# Patient Record
Sex: MALE | Race: WHITE | NOT HISPANIC OR LATINO | URBAN - METROPOLITAN AREA
[De-identification: names, ages, dates, MRNs, and addresses within clinical notes are randomized per-mention and may not be internally consistent; named-entity substitution may affect disease eponyms.]

---

## 2017-09-18 ENCOUNTER — OUTPATIENT (OUTPATIENT)
Dept: OUTPATIENT SERVICES | Facility: HOSPITAL | Age: 77
LOS: 1 days | End: 2017-09-18
Payer: COMMERCIAL

## 2017-09-18 ENCOUNTER — APPOINTMENT (OUTPATIENT)
Age: 77
End: 2017-09-18
Payer: COMMERCIAL

## 2017-09-18 DIAGNOSIS — Z98.89 OTHER SPECIFIED POSTPROCEDURAL STATES: Chronic | ICD-10-CM

## 2017-09-18 DIAGNOSIS — G45.9 TRANSIENT CEREBRAL ISCHEMIC ATTACK, UNSPECIFIED: ICD-10-CM

## 2017-09-18 DIAGNOSIS — Z95.5 PRESENCE OF CORONARY ANGIOPLASTY IMPLANT AND GRAFT: Chronic | ICD-10-CM

## 2017-09-18 PROCEDURE — 70551 MRI BRAIN STEM W/O DYE: CPT

## 2017-09-18 PROCEDURE — 70551 MRI BRAIN STEM W/O DYE: CPT | Mod: 26

## 2018-07-13 ENCOUNTER — APPOINTMENT (OUTPATIENT)
Dept: SURGERY | Facility: CLINIC | Age: 78
End: 2018-07-13
Payer: MEDICARE

## 2018-07-13 VITALS
OXYGEN SATURATION: 97 % | BODY MASS INDEX: 25.87 KG/M2 | HEART RATE: 71 BPM | TEMPERATURE: 98.1 F | WEIGHT: 191 LBS | HEIGHT: 72 IN | SYSTOLIC BLOOD PRESSURE: 138 MMHG | DIASTOLIC BLOOD PRESSURE: 84 MMHG

## 2018-07-13 DIAGNOSIS — R19.04 LEFT LOWER QUADRANT ABDOMINAL SWELLING, MASS AND LUMP: ICD-10-CM

## 2018-07-13 DIAGNOSIS — R10.32 LEFT LOWER QUADRANT PAIN: ICD-10-CM

## 2018-07-13 DIAGNOSIS — K40.90 UNILATERAL INGUINAL HERNIA, W/OUT OBSTRUCTION OR GANGRENE, NOT SPECIFIED AS RECURRENT: ICD-10-CM

## 2018-07-13 PROCEDURE — 99204 OFFICE O/P NEW MOD 45 MIN: CPT

## 2018-07-18 ENCOUNTER — OUTPATIENT (OUTPATIENT)
Dept: OUTPATIENT SERVICES | Facility: HOSPITAL | Age: 78
LOS: 1 days | End: 2018-07-18
Payer: COMMERCIAL

## 2018-07-18 DIAGNOSIS — Z98.89 OTHER SPECIFIED POSTPROCEDURAL STATES: Chronic | ICD-10-CM

## 2018-07-18 DIAGNOSIS — Z95.5 PRESENCE OF CORONARY ANGIOPLASTY IMPLANT AND GRAFT: Chronic | ICD-10-CM

## 2018-07-18 DIAGNOSIS — R06.00 DYSPNEA, UNSPECIFIED: ICD-10-CM

## 2018-07-18 DIAGNOSIS — I25.10 ATHEROSCLEROTIC HEART DISEASE OF NATIVE CORONARY ARTERY WITHOUT ANGINA PECTORIS: ICD-10-CM

## 2018-07-18 PROCEDURE — 78452 HT MUSCLE IMAGE SPECT MULT: CPT | Mod: 26

## 2018-07-18 PROCEDURE — 78452 HT MUSCLE IMAGE SPECT MULT: CPT

## 2018-07-18 PROCEDURE — 93018 CV STRESS TEST I&R ONLY: CPT

## 2018-07-18 PROCEDURE — A9500: CPT

## 2018-07-18 PROCEDURE — 93017 CV STRESS TEST TRACING ONLY: CPT

## 2018-07-18 PROCEDURE — 93016 CV STRESS TEST SUPVJ ONLY: CPT

## 2018-10-17 ENCOUNTER — APPOINTMENT (OUTPATIENT)
Dept: SURGERY | Facility: CLINIC | Age: 78
End: 2018-10-17
Payer: MEDICARE

## 2018-10-17 VITALS
HEART RATE: 63 BPM | BODY MASS INDEX: 26.14 KG/M2 | SYSTOLIC BLOOD PRESSURE: 144 MMHG | OXYGEN SATURATION: 99 % | DIASTOLIC BLOOD PRESSURE: 74 MMHG | WEIGHT: 193 LBS | TEMPERATURE: 97.5 F | HEIGHT: 72 IN

## 2018-10-17 DIAGNOSIS — K40.91 UNILATERAL INGUINAL HERNIA, W/OUT OBSTRUCTION OR GANGRENE, RECURRENT: ICD-10-CM

## 2018-10-17 PROCEDURE — 99214 OFFICE O/P EST MOD 30 MIN: CPT

## 2019-11-21 ENCOUNTER — APPOINTMENT (OUTPATIENT)
Dept: UROLOGY | Facility: CLINIC | Age: 79
End: 2019-11-21
Payer: MEDICARE

## 2019-11-21 VITALS
SYSTOLIC BLOOD PRESSURE: 139 MMHG | HEART RATE: 52 BPM | DIASTOLIC BLOOD PRESSURE: 87 MMHG | RESPIRATION RATE: 14 BRPM | WEIGHT: 195 LBS | BODY MASS INDEX: 26.41 KG/M2 | HEIGHT: 72 IN

## 2019-11-21 DIAGNOSIS — C61 MALIGNANT NEOPLASM OF PROSTATE: ICD-10-CM

## 2019-11-21 LAB
BILIRUB UR QL STRIP: NORMAL
CLARITY UR: CLEAR
COLLECTION METHOD: NORMAL
GLUCOSE UR-MCNC: 100
HCG UR QL: 1 EU/DL
HGB UR QL STRIP.AUTO: NORMAL
KETONES UR-MCNC: NORMAL
LEUKOCYTE ESTERASE UR QL STRIP: NORMAL
NITRITE UR QL STRIP: NORMAL
PH UR STRIP: 6
PROT UR STRIP-MCNC: 30
SP GR UR STRIP: 1.02

## 2019-11-21 PROCEDURE — 99204 OFFICE O/P NEW MOD 45 MIN: CPT | Mod: 25

## 2019-11-21 PROCEDURE — 81003 URINALYSIS AUTO W/O SCOPE: CPT | Mod: QW

## 2019-11-21 PROCEDURE — 51798 US URINE CAPACITY MEASURE: CPT

## 2019-11-22 LAB
APPEARANCE: CLEAR
BACTERIA: ABNORMAL
BILIRUBIN URINE: NEGATIVE
BLOOD URINE: NEGATIVE
CALCIUM OXALATE CRYSTALS: ABNORMAL
COLOR: YELLOW
GLUCOSE QUALITATIVE U: ABNORMAL
HYALINE CASTS: 1 /LPF
KETONES URINE: NEGATIVE
LEUKOCYTE ESTERASE URINE: NEGATIVE
MICROSCOPIC-UA: NORMAL
NITRITE URINE: NEGATIVE
PH URINE: 6
PROTEIN URINE: NORMAL
RED BLOOD CELLS URINE: 4 /HPF
SPECIFIC GRAVITY URINE: 1.03
SQUAMOUS EPITHELIAL CELLS: 2 /HPF
URIC ACID CRYSTALS: ABNORMAL
URINE CYTOLOGY: NORMAL
UROBILINOGEN URINE: NORMAL
WHITE BLOOD CELLS URINE: 2 /HPF

## 2019-11-25 LAB — BACTERIA UR CULT: NORMAL

## 2019-12-02 NOTE — HISTORY OF PRESENT ILLNESS
[FreeTextEntry1] : 80 yo male presents for Gross hematuria. \par Recently had urine test and was told has microscopic hematuria. Today saw pink urine. \par Has history of kidney stone, had surgery with Dr Loyola 5 years ago. \par No history of recurrent urinary tract infections. \par Smoker- past, 1PPD for 40 years. Quit- 2001. \par On Flomax. Reports variable stream, urinates every few hours or so during the day. Nocturia of 3-4 x. \par Endorses urinary incontinence. \par Denies dysuria, lower abdominal or flank pain, fever, chills or rigors.\par \par Has history of Prostate cancer status post Radiation therapy. \par \par

## 2019-12-02 NOTE — PHYSICAL EXAM
[Normal Appearance] : normal appearance [General Appearance - Well Developed] : well developed [General Appearance - In No Acute Distress] : no acute distress [FreeTextEntry1] : normal peripheral circulation  [] : no respiratory distress [Abdomen Soft] : soft [Abdomen Tenderness] : non-tender [Abdomen Mass (___ Cm)] : no abdominal mass palpated [Costovertebral Angle Tenderness] : no ~M costovertebral angle tenderness [Penis Abnormality] : normal circumcised penis [Urethral Meatus] : meatus normal [Scrotum] : the scrotum was normal [Epididymis] : the epididymides were normal [Testes Tenderness] : no tenderness of the testes [Testes Mass (___cm)] : there were no testicular masses [Prostate Tenderness] : the prostate was not tender [No Prostate Nodules] : no prostate nodules [Prostate Size ___ gm] : prostate size [unfilled] gm [Normal Station and Gait] : the gait and station were normal for the patient's age [Skin Color & Pigmentation] : normal skin color and pigmentation [No Focal Deficits] : no focal deficits [Oriented To Time, Place, And Person] : oriented to person, place, and time [No Palpable Adenopathy] : no palpable adenopathy

## 2019-12-02 NOTE — ASSESSMENT
[FreeTextEntry1] : Benign Prostatic Hyperplasia:\par Continue Flomax. \par \par Prostate cancer:\par Status post Radiation therapy. \par PSA low and stable. \par \par Gross hematuria:\par Discussed the differential diagnosis of hematuria including benign and malignant pathology- including but not limited to nephrolithiasis, bladder stone, urinary tract infection, glomerular disease, renal cancer, bladder cancer, prostate cancer. We also discussed the chance that workup will not reveal a source for the bleeding. The patient understands that the hematuria could be from an upper tract (kidney or ureter) or lower tract (bladder, urethra, prostate) and that workup includes imaging and direct visualization of all of these.\par \par Will proceed with work up with Urinalysis with microscopy, Urine culture, Urine cytology, CT Urogram and Cystoscopy. \par \par Return to office after CT scan.

## 2019-12-02 NOTE — LETTER BODY
[Dear  ___] : Dear  [unfilled], [Consult Letter:] : I had the pleasure of evaluating your patient, [unfilled]. [( Thank you for referring [unfilled] for consultation for _____ )] : Thank you for referring [unfilled] for consultation for [unfilled] [Please see my note below.] : Please see my note below. [Consult Closing:] : Thank you very much for allowing me to participate in the care of this patient.  If you have any questions, please do not hesitate to contact me. [Sincerely,] : Sincerely, [FreeTextEntry3] : Sharan Avendano MD\par  of Urology\par [facility]\par \par Offices:\par [facility] @\par [address], [place] [address]\par and\par [address], [place] [address], [address]\par and\par [address] [address]\par \par TEL: [phone]\par FAX: [phone]

## 2019-12-09 ENCOUNTER — APPOINTMENT (OUTPATIENT)
Dept: UROLOGY | Facility: CLINIC | Age: 79
End: 2019-12-09
Payer: MEDICARE

## 2019-12-13 LAB
APPEARANCE: CLEAR
BACTERIA: NEGATIVE
BILIRUBIN URINE: NEGATIVE
BLOOD URINE: NEGATIVE
CALCIUM OXALATE CRYSTALS: ABNORMAL
COLOR: YELLOW
GLUCOSE QUALITATIVE U: ABNORMAL
HYALINE CASTS: 0 /LPF
KETONES URINE: NEGATIVE
LEUKOCYTE ESTERASE URINE: NEGATIVE
MICROSCOPIC-UA: NORMAL
NITRITE URINE: NEGATIVE
PH URINE: 6.5
PROTEIN URINE: NORMAL
RED BLOOD CELLS URINE: 1 /HPF
SPECIFIC GRAVITY URINE: 1.02
SQUAMOUS EPITHELIAL CELLS: 1 /HPF
UROBILINOGEN URINE: ABNORMAL
WHITE BLOOD CELLS URINE: 5 /HPF

## 2019-12-16 DIAGNOSIS — N39.0 URINARY TRACT INFECTION, SITE NOT SPECIFIED: ICD-10-CM

## 2019-12-16 LAB — BACTERIA UR CULT: NORMAL

## 2019-12-23 ENCOUNTER — APPOINTMENT (OUTPATIENT)
Dept: UROLOGY | Facility: CLINIC | Age: 79
End: 2019-12-23
Payer: MEDICARE

## 2019-12-23 VITALS
HEART RATE: 72 BPM | WEIGHT: 195 LBS | DIASTOLIC BLOOD PRESSURE: 75 MMHG | HEIGHT: 72 IN | SYSTOLIC BLOOD PRESSURE: 153 MMHG | BODY MASS INDEX: 26.41 KG/M2

## 2019-12-23 DIAGNOSIS — N20.0 CALCULUS OF KIDNEY: ICD-10-CM

## 2019-12-23 PROCEDURE — 52000 CYSTOURETHROSCOPY: CPT

## 2019-12-23 PROCEDURE — 99214 OFFICE O/P EST MOD 30 MIN: CPT | Mod: 25

## 2019-12-24 LAB
APPEARANCE: CLEAR
BACTERIA: NEGATIVE
BILIRUBIN URINE: NEGATIVE
BLOOD URINE: ABNORMAL
COLOR: COLORLESS
GLUCOSE QUALITATIVE U: NEGATIVE
HYALINE CASTS: 1 /LPF
KETONES URINE: NEGATIVE
LEUKOCYTE ESTERASE URINE: NEGATIVE
MICROSCOPIC-UA: NORMAL
NITRITE URINE: NEGATIVE
PH URINE: 5.5
PROTEIN URINE: NEGATIVE
RED BLOOD CELLS URINE: 2 /HPF
SPECIFIC GRAVITY URINE: 1
SQUAMOUS EPITHELIAL CELLS: 1 /HPF
UROBILINOGEN URINE: NORMAL
WHITE BLOOD CELLS URINE: 0 /HPF

## 2019-12-26 PROBLEM — N20.0 RIGHT KIDNEY STONE: Status: ACTIVE | Noted: 2019-12-26

## 2019-12-26 LAB — BACTERIA UR CULT: NORMAL

## 2019-12-26 NOTE — ASSESSMENT
[FreeTextEntry1] : Benign Prostatic Hyperplasia:\par Continue Flomax. \par \par Kidney stone:\par Discussed the management options for non obstructing kidney stones- watch and wait Vs Ureteroscopy Vs Shock wave lithotripsy(if radio-opaque) Vs Percutaneous nephrolithotomy. Risks and benefits of each modality were discussed. Pt not interested in active intervention at this point. \par Recommended Kidney stone prevention diet:\par Good oral hydration so that urine is clear to light yellow, usually 1.5 to 2 Liters of fluids, mainly water\par Increasing Citrate in diet by consuming citrus fruits and juices- ru, limes, oranges, grapefruits and berries \par Less red meat\par Less salt\par Limit foods with oxalate like- dark green vegetables, rhubarb, chocolate, wheat bran, nuts, cranberries, and beans\par \par Gross hematuria:\par Discussed work up of hematuria so far. Recommended Cystoscopy. Discussed risks and benefits. \par Patient agreeable. Informed consent obtained. \par On Cystoscopy today- Bilobar prostatic enlargement with moderately enlarged lateral lobes with coaptation. \par Obtained catheterized specimen- Will get Urinalysis and Urine culture. \par Will inform results. \par \par Return to office in 3 months or sooner if any issues.

## 2019-12-26 NOTE — HISTORY OF PRESENT ILLNESS
[FreeTextEntry1] : 78 yo male presents for follow up. \par Taking Cefuroxime, has had positive Urine culture since last visit. \par No new episode of hematuria. \par Taking Flomax- urinating well. \par Denies dysuria, lower abdominal or flank pain, fever, chills or rigors.\par \par Initially seen for Gross hematuria. \par Recently had urine test and was told has microscopic hematuria. The day last visit saw pink urine. \par Has history of kidney stone, had surgery with Dr Loyola 5 years ago. \par No history of recurrent urinary tract infections. \par Smoker- past, 1PPD for 40 years. Quit- 2001. \par On Flomax. Reported variable stream, urinates every few hours or so during the day. Nocturia of 3-4 x. \par Endorsed urinary incontinence. \par \par Has history of Prostate cancer status post Radiation therapy. \par \par

## 2019-12-26 NOTE — LETTER BODY
[Dear  ___] : Dear  [unfilled], [Sincerely,] : Sincerely, [Please see my note below.] : Please see my note below. [Courtesy Letter:] : I had the pleasure of seeing your patient, [unfilled], in my office today. [FreeTextEntry3] : Sharan Avendano MD\par  of Urology\par [facility]\par \par Offices:\par [facility] @\par [address], [place] [address]\par and\par [address], [place] [address], [address]\par and\par [address] [address]\par \par TEL: [phone]\par FAX: [phone]

## 2020-04-02 ENCOUNTER — APPOINTMENT (OUTPATIENT)
Dept: UROLOGY | Facility: CLINIC | Age: 80
End: 2020-04-02

## 2020-05-28 ENCOUNTER — APPOINTMENT (OUTPATIENT)
Dept: UROLOGY | Facility: CLINIC | Age: 80
End: 2020-05-28

## 2020-06-04 ENCOUNTER — APPOINTMENT (OUTPATIENT)
Dept: UROLOGY | Facility: CLINIC | Age: 80
End: 2020-06-04

## 2020-06-29 ENCOUNTER — APPOINTMENT (OUTPATIENT)
Dept: UROLOGY | Facility: CLINIC | Age: 80
End: 2020-06-29

## 2020-08-13 ENCOUNTER — APPOINTMENT (OUTPATIENT)
Dept: UROLOGY | Facility: CLINIC | Age: 80
End: 2020-08-13
Payer: MEDICARE

## 2020-08-13 VITALS
WEIGHT: 195 LBS | DIASTOLIC BLOOD PRESSURE: 62 MMHG | TEMPERATURE: 97.6 F | HEART RATE: 56 BPM | BODY MASS INDEX: 26.41 KG/M2 | HEIGHT: 72 IN | SYSTOLIC BLOOD PRESSURE: 137 MMHG

## 2020-08-13 DIAGNOSIS — R31.0 GROSS HEMATURIA: ICD-10-CM

## 2020-08-13 PROCEDURE — 99214 OFFICE O/P EST MOD 30 MIN: CPT

## 2020-08-13 NOTE — ASSESSMENT
[FreeTextEntry1] : Benign Prostatic Hyperplasia:\par Will get Urinalysis with reflex Urine culture. Will inform results. \par Continue Flomax. \par \par Gross hematuria:\par Underwent work up for gross hematuria in December 2019: non obstructing right kidney stone and enlarged prostate. \par \par History of Prostate cancer:\par Status post Radiation therapy. \par PSA was low and stable. \par Continue PSA monitoring.\par \par Return to office in 4 months or sooner if any issues- will do Uroflo and PVR.

## 2020-08-13 NOTE — HISTORY OF PRESENT ILLNESS
[FreeTextEntry1] : 79 yo male presents for follow up. \par Taking Flomax, urinates with reasonable stream every few hours or so during the day, nocturia 2-3 x. \par Complaining of off and on dysuria and has occasional urge incontinence. \par Has history of Prostate cancer status post Radiation therapy. \par \par Underwent work up for gross hematuria in December 2019: non obstructing right kidney stone and enlarged prostate. \par \par Initially seen for Gross hematuria. \par Recently had urine test and was told has microscopic hematuria. The day last visit saw pink urine. \par Has history of kidney stone, had surgery with Dr Loyola 5 years ago. \par No history of recurrent urinary tract infections. \par Smoker- past, 1PPD for 40 years. Quit- 2001. \par On Flomax. Reported variable stream, urinates every few hours or so during the day. Nocturia of 3-4 x. \par Endorsed urinary incontinence. \par \par Has history of Prostate cancer status post Radiation therapy. \par \par

## 2020-08-13 NOTE — LETTER BODY
[Dear  ___] : Dear  [unfilled], [Courtesy Letter:] : I had the pleasure of seeing your patient, [unfilled], in my office today. [Please see my note below.] : Please see my note below. [Sincerely,] : Sincerely, [FreeTextEntry3] : Sharan Avendano MD\par  of Urology\par [facility]\par \par Offices:\par [facility] @\par [address], [place] [address]\par and\par [address], [place] [address], [address]\par and\par [address] [address]\par \par TEL: [phone]\par FAX: [phone]

## 2020-08-13 NOTE — PHYSICAL EXAM
[Normal Appearance] : normal appearance [Skin Color & Pigmentation] : normal skin color and pigmentation [General Appearance - In No Acute Distress] : no acute distress [] : no respiratory distress [FreeTextEntry1] : normal peripheral circulation  [Oriented To Time, Place, And Person] : oriented to person, place, and time

## 2020-10-21 ENCOUNTER — OUTPATIENT (OUTPATIENT)
Dept: OUTPATIENT SERVICES | Facility: HOSPITAL | Age: 80
LOS: 1 days | End: 2020-10-21
Payer: COMMERCIAL

## 2020-10-21 DIAGNOSIS — I25.10 ATHEROSCLEROTIC HEART DISEASE OF NATIVE CORONARY ARTERY WITHOUT ANGINA PECTORIS: ICD-10-CM

## 2020-10-21 DIAGNOSIS — Z98.89 OTHER SPECIFIED POSTPROCEDURAL STATES: Chronic | ICD-10-CM

## 2020-10-21 DIAGNOSIS — R06.00 DYSPNEA, UNSPECIFIED: ICD-10-CM

## 2020-10-21 DIAGNOSIS — Z95.5 PRESENCE OF CORONARY ANGIOPLASTY IMPLANT AND GRAFT: Chronic | ICD-10-CM

## 2020-10-21 PROCEDURE — 78452 HT MUSCLE IMAGE SPECT MULT: CPT | Mod: 26

## 2020-10-21 PROCEDURE — 78452 HT MUSCLE IMAGE SPECT MULT: CPT

## 2020-10-21 PROCEDURE — 93017 CV STRESS TEST TRACING ONLY: CPT

## 2020-10-21 PROCEDURE — 93016 CV STRESS TEST SUPVJ ONLY: CPT

## 2020-10-21 PROCEDURE — 93018 CV STRESS TEST I&R ONLY: CPT

## 2020-10-21 PROCEDURE — A9500: CPT

## 2020-12-08 ENCOUNTER — APPOINTMENT (OUTPATIENT)
Dept: UROLOGY | Facility: CLINIC | Age: 80
End: 2020-12-08
Payer: MEDICARE

## 2020-12-08 VITALS — SYSTOLIC BLOOD PRESSURE: 156 MMHG | TEMPERATURE: 97.2 F | DIASTOLIC BLOOD PRESSURE: 73 MMHG | HEART RATE: 64 BPM

## 2020-12-08 DIAGNOSIS — N13.8 BENIGN PROSTATIC HYPERPLASIA WITH LOWER URINARY TRACT SYMPMS: ICD-10-CM

## 2020-12-08 DIAGNOSIS — N40.1 BENIGN PROSTATIC HYPERPLASIA WITH LOWER URINARY TRACT SYMPMS: ICD-10-CM

## 2020-12-08 DIAGNOSIS — Z85.46 PERSONAL HISTORY OF MALIGNANT NEOPLASM OF PROSTATE: ICD-10-CM

## 2020-12-08 PROCEDURE — 51741 ELECTRO-UROFLOWMETRY FIRST: CPT

## 2020-12-08 PROCEDURE — 51798 US URINE CAPACITY MEASURE: CPT | Mod: 59

## 2020-12-08 PROCEDURE — 99214 OFFICE O/P EST MOD 30 MIN: CPT | Mod: 25

## 2020-12-08 PROCEDURE — 99072 ADDL SUPL MATRL&STAF TM PHE: CPT

## 2020-12-14 PROBLEM — N40.1 BPH WITH OBSTRUCTION/LOWER URINARY TRACT SYMPTOMS: Status: ACTIVE | Noted: 2019-11-21

## 2020-12-14 PROBLEM — Z85.46 HISTORY OF PROSTATE CANCER: Status: ACTIVE | Noted: 2020-08-13

## 2020-12-14 NOTE — ASSESSMENT
[FreeTextEntry1] : History of Prostate cancer:\par Had PSA with PCP in November 2020: 0.42. \par \par Benign Prostatic Hyperplasia:\par See Uroflo and PVR. \par Continue Flomax.\par \par Return to office in 1 year or sooner if any issues- will do Uroflo/PVR \par

## 2020-12-14 NOTE — HISTORY OF PRESENT ILLNESS
[FreeTextEntry1] : 79 yo male presents for follow up. \par Taking Flomax, urinates with reasonable stream every few hours or so during the day, nocturia 2-3 x. \par Complaining of off and on dysuria and has occasional urge incontinence. \par Has history of Prostate cancer status post Radiation therapy.\par \par Underwent work up for gross hematuria in December 2019: non obstructing right kidney stone and enlarged prostate. \par \par Initially seen for Gross hematuria. \par Recently had urine test and was told has microscopic hematuria. The day last visit saw pink urine. \par Has history of kidney stone, had surgery with Dr Loyola 5 years ago. \par No history of recurrent urinary tract infections. \par Smoker- past, 1PPD for 40 years. Quit- 2001. \par On Flomax. Reported variable stream, urinates every few hours or so during the day. Nocturia of 3-4 x. \par Endorsed urinary incontinence. \par \par Has history of Prostate cancer status post Radiation therapy. \par \par

## 2020-12-14 NOTE — LETTER BODY
[Dear  ___] : Dear  [unfilled], [Courtesy Letter:] : I had the pleasure of seeing your patient, [unfilled], in my office today. [Please see my note below.] : Please see my note below. [Sincerely,] : Sincerely, [FreeTextEntry3] : Sharan Avendano MD\par  of Urology\[facility]\par \par Offices:\par [facility] @\par [address], [place] [address]\par and\par [address], [place] [address], [address]\par and\par [address] [address]\par \par TEL: [phone]\par FAX: [phone]

## 2020-12-14 NOTE — PHYSICAL EXAM
[Urethral Meatus] : meatus normal [Penis Abnormality] : normal circumcised penis [Scrotum] : the scrotum was normal [Epididymis] : the epididymides were normal [Testes Tenderness] : no tenderness of the testes [Testes Mass (___cm)] : there were no testicular masses [Prostate Tenderness] : the prostate was not tender [No Prostate Nodules] : no prostate nodules [Prostate Size ___ gm] : prostate size [unfilled] gm [Normal Appearance] : normal appearance [General Appearance - In No Acute Distress] : no acute distress [Abdomen Soft] : soft [Abdomen Tenderness] : non-tender [Skin Color & Pigmentation] : normal skin color and pigmentation [FreeTextEntry1] : normal peripheral circulation  [] : no respiratory distress [Oriented To Time, Place, And Person] : oriented to person, place, and time [Normal Station and Gait] : the gait and station were normal for the patient's age [No Focal Deficits] : no focal deficits

## 2020-12-21 PROBLEM — N39.0 ACUTE UTI: Status: RESOLVED | Noted: 2019-12-16 | Resolved: 2020-12-21

## 2022-01-11 ENCOUNTER — APPOINTMENT (OUTPATIENT)
Dept: UROLOGY | Facility: CLINIC | Age: 82
End: 2022-01-11

## 2022-06-15 ENCOUNTER — APPOINTMENT (OUTPATIENT)
Dept: VASCULAR SURGERY | Facility: CLINIC | Age: 82
End: 2022-06-15
Payer: MEDICARE

## 2022-06-15 VITALS
BODY MASS INDEX: 23.3 KG/M2 | SYSTOLIC BLOOD PRESSURE: 159 MMHG | TEMPERATURE: 98.7 F | WEIGHT: 172 LBS | DIASTOLIC BLOOD PRESSURE: 69 MMHG | OXYGEN SATURATION: 97 % | HEART RATE: 62 BPM | HEIGHT: 72 IN

## 2022-06-15 DIAGNOSIS — I73.9 PERIPHERAL VASCULAR DISEASE, UNSPECIFIED: ICD-10-CM

## 2022-06-15 PROCEDURE — 99203 OFFICE O/P NEW LOW 30 MIN: CPT

## 2022-06-15 PROCEDURE — 93923 UPR/LXTR ART STDY 3+ LVLS: CPT

## 2022-06-15 NOTE — HISTORY OF PRESENT ILLNESS
[FreeTextEntry1] : New 81 yo male BPH, CAD, DM II, HLD, HTN, presents for evaluation of arthrosclerotic plaque in aorta seen on recent CT scan. Pt denies any post prandial pain. Pt denies any calf claudication, rest pain or non-healing wounds. He is able to walk with his dogs for 45 minutes per day without stopping. Pt denies leg swelling.

## 2022-06-15 NOTE — ASSESSMENT
[FreeTextEntry1] : 81 yo male with arthrosclerotic plaque on CT scan. Pt denies post prandial pain. Pt denies calf claudication, rest pain or wounds on feet. WADE/PVR today is normal ( right 1.12, left 1.13)\par \par Pt counseled on results of WADE/PVR and that it is normal \par Pt counseled to continue ASA and Simvastatin \par Pt counseled to continue to walk daily for exercise\par RTC in 6 months to monitor symptoms \par \par A total of 35 minutes was spent with patient and coordinating care.\par \par \par

## 2022-06-15 NOTE — PHYSICAL EXAM
[0] : left 0 [2+] : left 2+ [Ankle Swelling (On Exam)] : present [Ankle Swelling On The Right] : mild [Varicose Veins Of Lower Extremities] : not present [] : not present [Skin Ulcer] : no ulcer [Alert] : alert [Oriented to Person] : oriented to person [Oriented to Place] : oriented to place [Oriented to Time] : oriented to time [Calm] : calm [de-identified] : NAD. well appearing  [FreeTextEntry1] : PT non-palpable due to edema

## 2022-12-13 ENCOUNTER — APPOINTMENT (OUTPATIENT)
Dept: VASCULAR SURGERY | Facility: CLINIC | Age: 82
End: 2022-12-13

## 2023-07-27 ENCOUNTER — EMERGENCY (EMERGENCY)
Facility: HOSPITAL | Age: 83
LOS: 1 days | Discharge: HOME | End: 2023-07-27
Attending: EMERGENCY MEDICINE | Admitting: EMERGENCY MEDICINE
Payer: COMMERCIAL

## 2023-07-27 VITALS
RESPIRATION RATE: 16 BRPM | HEIGHT: 71 IN | HEART RATE: 72 BPM | TEMPERATURE: 98 F | WEIGHT: 158.07 LBS | DIASTOLIC BLOOD PRESSURE: 67 MMHG | SYSTOLIC BLOOD PRESSURE: 104 MMHG | OXYGEN SATURATION: 98 %

## 2023-07-27 DIAGNOSIS — Z98.89 OTHER SPECIFIED POSTPROCEDURAL STATES: Chronic | ICD-10-CM

## 2023-07-27 DIAGNOSIS — Z95.5 PRESENCE OF CORONARY ANGIOPLASTY IMPLANT AND GRAFT: Chronic | ICD-10-CM

## 2023-07-27 PROCEDURE — 90715 TDAP VACCINE 7 YRS/> IM: CPT

## 2023-07-27 PROCEDURE — 12001 RPR S/N/AX/GEN/TRNK 2.5CM/<: CPT

## 2023-07-27 PROCEDURE — 90471 IMMUNIZATION ADMIN: CPT

## 2023-07-27 PROCEDURE — 72125 CT NECK SPINE W/O DYE: CPT | Mod: 26,MA

## 2023-07-27 PROCEDURE — 99284 EMERGENCY DEPT VISIT MOD MDM: CPT | Mod: 25

## 2023-07-27 PROCEDURE — 70450 CT HEAD/BRAIN W/O DYE: CPT | Mod: MA

## 2023-07-27 PROCEDURE — 70450 CT HEAD/BRAIN W/O DYE: CPT | Mod: 26,MA

## 2023-07-27 PROCEDURE — 72125 CT NECK SPINE W/O DYE: CPT | Mod: MA

## 2023-07-27 RX ORDER — TETANUS TOXOID, REDUCED DIPHTHERIA TOXOID AND ACELLULAR PERTUSSIS VACCINE, ADSORBED 5; 2.5; 8; 8; 2.5 [IU]/.5ML; [IU]/.5ML; UG/.5ML; UG/.5ML; UG/.5ML
0.5 SUSPENSION INTRAMUSCULAR ONCE
Refills: 0 | Status: COMPLETED | OUTPATIENT
Start: 2023-07-27 | End: 2023-07-27

## 2023-07-27 RX ADMIN — TETANUS TOXOID, REDUCED DIPHTHERIA TOXOID AND ACELLULAR PERTUSSIS VACCINE, ADSORBED 0.5 MILLILITER(S): 5; 2.5; 8; 8; 2.5 SUSPENSION INTRAMUSCULAR at 22:12

## 2023-07-27 NOTE — ED PROVIDER NOTE - NSFOLLOWUPINSTRUCTIONS_ED_ALL_ED_FT
Keep wound clean and dry for 24 hours then may wash or shower with mild soap or shampoo and water twice daily and then apply bacitracin for at least the first 4 to 5 days.  Return for staple removal in 7 days.  Return to the emergency room for fever, redness around the wound or pus, lethargy, uncontrolled vomiting, numbness or weakness of the arms or legs

## 2023-07-27 NOTE — ED PROVIDER NOTE - PATIENT PORTAL LINK FT
You can access the FollowMyHealth Patient Portal offered by [facility] by registering at the following website: [email or web address]. By joining [employer]’s FollowMyHealth portal, you will also be able to view your health information using other applications (apps) compatible with our system.

## 2023-07-27 NOTE — ED PROVIDER NOTE - CLINICAL SUMMARY MEDICAL DECISION MAKING FREE TEXT BOX
Patient is an elderly male who is on a baby aspirin who tripped over his dog and fell back striking his upper occiput on furniture.  Patient denies loss consciousness nausea vomiting confusion neck pain numbness or weakness but has a laceration to the scalp was brought in by family for evaluation.  Neurologic exam is within normal limits patient with 3 cm vertical laceration in the upper occipital region into muscle.  CT of the head and neck were performed and appear negative will administer Tdap and wound closed with staples.  Wound care for 1 week and then staples removed after 1 week.  Return for lethargy, confusion or uncontrolled vomiting

## 2023-07-27 NOTE — ED PROVIDER NOTE - NSICDXPASTMEDICALHX_GEN_ALL_CORE_FT
PAST MEDICAL HISTORY:  Gross hematuria     Hypercholesterolemia     Hypertension     Prostate cancer     Type 2 diabetes mellitus

## 2023-07-27 NOTE — ED PROVIDER NOTE - MUSCULOSKELETAL, MLM
Spine appears normal, range of motion is not limited, no muscle or joint tenderness Other specify/ENT

## 2023-07-27 NOTE — ED PROVIDER NOTE - OBJECTIVE STATEMENT
Patient is an elderly male who is on a baby aspirin who tripped over his dog and fell back striking his upper occiput on furniture.  Patient denies loss consciousness nausea vomiting confusion neck pain numbness or weakness but has a laceration to the scalp was brought in by family for evaluation.  pt denies neck pain, or any other complaint, ? last td

## 2023-07-27 NOTE — ED ADULT NURSE NOTE - OBJECTIVE STATEMENT
Pt brought in by family member s/p fall, fell back after tripping over dog, laceration to back of head. takes daily baby aspirin. Denies n/v, dizziness, blurry vision, numbness, tingling. safety maintained, call bell within reach. Nursing care ongoing.

## 2023-07-27 NOTE — ED PROVIDER NOTE - NSICDXPASTSURGICALHX_GEN_ALL_CORE_FT
PAST SURGICAL HISTORY:  H/O colonoscopy     H/O colostomy Colostomy & Reversal 1956    H/O hernia repair     H/O lithotripsy     Status post cardiac catheterization 2001 [facility]    Status post coronary artery stent placement X 3 2001 [facility]

## 2023-07-27 NOTE — ED ADULT TRIAGE NOTE - NSWEIGHTCALCTOOLDRUG_GEN_A_CORE
used chronic back pain no neurological deficits: medication, no narcotics, ISTOP cases active, to f/u with outpatient spine and pain management

## 2023-10-04 ENCOUNTER — OFFICE (OUTPATIENT)
Dept: URBAN - METROPOLITAN AREA CLINIC 6 | Facility: CLINIC | Age: 83
Setting detail: OPHTHALMOLOGY
End: 2023-10-04
Payer: COMMERCIAL

## 2023-10-04 DIAGNOSIS — H25.13: ICD-10-CM

## 2023-10-04 DIAGNOSIS — H52.4: ICD-10-CM

## 2023-10-04 PROBLEM — H43.813 POSTERIOR VITREOUS DETACHMENT; BOTH EYES: Status: ACTIVE | Noted: 2023-10-04

## 2023-10-04 PROBLEM — E11.9 DIABETES TYPE 2 NO RETINOPATHY: Status: ACTIVE | Noted: 2023-10-04

## 2023-10-04 PROCEDURE — 92015 DETERMINE REFRACTIVE STATE: CPT | Performed by: OPHTHALMOLOGY

## 2023-10-04 PROCEDURE — 92004 COMPRE OPH EXAM NEW PT 1/>: CPT | Performed by: OPHTHALMOLOGY

## 2023-10-04 ASSESSMENT — REFRACTION_MANIFEST
OD_SPHERE: +1.25
OS_CYLINDER: -1.75
OD_SPHERE: +1.25
OS_VA1: 20/30-2
OD_VA1: 20/40-1
OD_ADD: +2.50
OD_AXIS: 095
OS_SPHERE: +1.50
OS_AXIS: 080
OS_AXIS: 080
OD_VA1: 20/40-1
OS_CYLINDER: -1.75
OS_ADD: +2.50
OS_VA1: 20/30-2
OD_CYLINDER: -1.25
OS_SPHERE: +1.50
OD_CYLINDER: -1.25
OD_AXIS: 095

## 2023-10-04 ASSESSMENT — AXIALLENGTH_DERIVED
OD_AL: 23.1281
OD_AL: 23.3169
OD_AL: 23.1281

## 2023-10-04 ASSESSMENT — TONOMETRY
OS_IOP_MMHG: 18
OD_IOP_MMHG: 15

## 2023-10-04 ASSESSMENT — REFRACTION_AUTOREFRACTION
OD_SPHERE: +0.75
OD_CYLINDER: -1.25
OS_AXIS: 079
OS_CYLINDER: -1.25
OD_AXIS: 093
OS_SPHERE: +1.00

## 2023-10-04 ASSESSMENT — CONFRONTATIONAL VISUAL FIELD TEST (CVF)
OS_FINDINGS: FULL
OD_FINDINGS: FULL

## 2023-10-04 ASSESSMENT — VISUAL ACUITY
OS_BCVA: 20/30-1
OD_BCVA: 20/40-2

## 2023-10-04 ASSESSMENT — SPHEQUIV_DERIVED
OS_SPHEQUIV: 0.625
OD_SPHEQUIV: 0.125
OD_SPHEQUIV: 0.625
OS_SPHEQUIV: 0.625
OS_SPHEQUIV: 0.375
OD_SPHEQUIV: 0.625

## 2023-10-04 ASSESSMENT — KERATOMETRY
OD_K2POWER_DIOPTERS: 44.25
OD_K1POWER_DIOPTERS: 44.00
OD_AXISANGLE_DEGREES: 066
OS_K1POWER_DIOPTERS: ERROR

## 2023-10-22 ENCOUNTER — TRANSCRIPTION ENCOUNTER (OUTPATIENT)
Age: 83
End: 2023-10-22

## 2023-10-23 ENCOUNTER — OUTPATIENT (OUTPATIENT)
Dept: OUTPATIENT SERVICES | Facility: HOSPITAL | Age: 83
LOS: 1 days | End: 2023-10-23
Payer: COMMERCIAL

## 2023-10-23 ENCOUNTER — RESULT REVIEW (OUTPATIENT)
Age: 83
End: 2023-10-23

## 2023-10-23 DIAGNOSIS — Z98.89 OTHER SPECIFIED POSTPROCEDURAL STATES: Chronic | ICD-10-CM

## 2023-10-23 DIAGNOSIS — Z95.5 PRESENCE OF CORONARY ANGIOPLASTY IMPLANT AND GRAFT: Chronic | ICD-10-CM

## 2023-10-23 DIAGNOSIS — Z12.11 ENCOUNTER FOR SCREENING FOR MALIGNANT NEOPLASM OF COLON: ICD-10-CM

## 2023-10-23 PROCEDURE — C1889: CPT

## 2023-10-23 PROCEDURE — 45380 COLONOSCOPY AND BIOPSY: CPT

## 2023-10-23 PROCEDURE — 88305 TISSUE EXAM BY PATHOLOGIST: CPT

## 2023-10-23 PROCEDURE — 88305 TISSUE EXAM BY PATHOLOGIST: CPT | Mod: 26

## 2023-10-23 DEVICE — CLIP RESOLUTION 360 235CM: Type: IMPLANTABLE DEVICE | Status: FUNCTIONAL

## 2023-10-23 DEVICE — HEMOSPRAY HEMOSTAT ENDO 7F: Type: IMPLANTABLE DEVICE | Status: FUNCTIONAL

## 2023-10-24 LAB
SURGICAL PATHOLOGY STUDY: SIGNIFICANT CHANGE UP
SURGICAL PATHOLOGY STUDY: SIGNIFICANT CHANGE UP

## 2024-02-29 ENCOUNTER — RESULT REVIEW (OUTPATIENT)
Age: 84
End: 2024-02-29

## 2024-02-29 ENCOUNTER — OUTPATIENT (OUTPATIENT)
Dept: OUTPATIENT SERVICES | Facility: HOSPITAL | Age: 84
LOS: 1 days | End: 2024-02-29
Payer: COMMERCIAL

## 2024-02-29 DIAGNOSIS — Z98.89 OTHER SPECIFIED POSTPROCEDURAL STATES: Chronic | ICD-10-CM

## 2024-02-29 DIAGNOSIS — I48.0 PAROXYSMAL ATRIAL FIBRILLATION: ICD-10-CM

## 2024-02-29 DIAGNOSIS — Z95.5 PRESENCE OF CORONARY ANGIOPLASTY IMPLANT AND GRAFT: Chronic | ICD-10-CM

## 2024-02-29 DIAGNOSIS — R06.09 OTHER FORMS OF DYSPNEA: ICD-10-CM

## 2024-02-29 PROCEDURE — 78452 HT MUSCLE IMAGE SPECT MULT: CPT | Mod: 26,MC

## 2024-02-29 PROCEDURE — 93017 CV STRESS TEST TRACING ONLY: CPT

## 2024-02-29 PROCEDURE — 93016 CV STRESS TEST SUPVJ ONLY: CPT | Mod: MC

## 2024-02-29 PROCEDURE — 78452 HT MUSCLE IMAGE SPECT MULT: CPT | Mod: MC

## 2024-02-29 PROCEDURE — 93018 CV STRESS TEST I&R ONLY: CPT | Mod: MC

## 2024-02-29 PROCEDURE — A9500: CPT

## 2024-03-05 ENCOUNTER — APPOINTMENT (OUTPATIENT)
Dept: ELECTROPHYSIOLOGY | Facility: CLINIC | Age: 84
End: 2024-03-05
Payer: MEDICARE

## 2024-03-05 ENCOUNTER — NON-APPOINTMENT (OUTPATIENT)
Age: 84
End: 2024-03-05

## 2024-03-05 VITALS
SYSTOLIC BLOOD PRESSURE: 146 MMHG | HEIGHT: 72 IN | BODY MASS INDEX: 22.35 KG/M2 | WEIGHT: 165 LBS | DIASTOLIC BLOOD PRESSURE: 68 MMHG | HEART RATE: 70 BPM | OXYGEN SATURATION: 98 %

## 2024-03-05 PROCEDURE — 99203 OFFICE O/P NEW LOW 30 MIN: CPT | Mod: 25

## 2024-03-05 PROCEDURE — 93000 ELECTROCARDIOGRAM COMPLETE: CPT

## 2024-03-05 RX ORDER — CEFUROXIME AXETIL 500 MG/1
500 TABLET ORAL
Qty: 20 | Refills: 0 | Status: DISCONTINUED | COMMUNITY
Start: 2019-12-16 | End: 2024-03-05

## 2024-03-05 RX ORDER — OXYBUTYNIN CHLORIDE 10 MG/1
10 TABLET, EXTENDED RELEASE ORAL DAILY
Refills: 0 | Status: ACTIVE | COMMUNITY

## 2024-03-05 RX ORDER — APIXABAN 5 MG/1
5 TABLET, FILM COATED ORAL TWICE DAILY
Refills: 0 | Status: ACTIVE | COMMUNITY

## 2024-03-05 RX ORDER — FENOFIBRATE 145 MG/1
145 TABLET, COATED ORAL DAILY
Refills: 0 | Status: ACTIVE | COMMUNITY

## 2024-03-05 RX ORDER — ASPIRIN 81 MG
81 TABLET, DELAYED RELEASE (ENTERIC COATED) ORAL DAILY
Refills: 0 | Status: ACTIVE | COMMUNITY

## 2024-03-05 NOTE — DISCUSSION/SUMMARY
[FreeTextEntry1] : In summary, the patient has a history of CAD s/p PCI, HTN and CKD. He has been in AF since 12/2023. His rates are well controlled on metoprolol. It is unclear if he has any symptoms related to AF. Recommend a PORFRIIO/DCCV to clarfiy. If the patient notes improvement in sinus rhythm would then consider options for long-term rhythm control.   [EKG obtained to assist in diagnosis and management of assessed problem(s)] : EKG obtained to assist in diagnosis and management of assessed problem(s)

## 2024-03-05 NOTE — HISTORY OF PRESENT ILLNESS
[FreeTextEntry1] : The patient is an 84-year-old male referred for arrhythmia management. The patient has a history of persistent atrial fibrillation, CAD with 3 stents (16 years back), HTN, PAD, CKD, and prostate CA. He was diagnosed with AF first in 12/2023 while hospitalized. EF was preserved on TTE in 12/2023 (EF 60%). He notes some shortness of breath with activity which he states was present even before the diagnosis of AF.

## 2024-03-07 ENCOUNTER — APPOINTMENT (OUTPATIENT)
Dept: SURGERY | Facility: CLINIC | Age: 84
End: 2024-03-07
Payer: MEDICARE

## 2024-03-07 VITALS
BODY MASS INDEX: 22.35 KG/M2 | HEIGHT: 72 IN | OXYGEN SATURATION: 98 % | WEIGHT: 165 LBS | SYSTOLIC BLOOD PRESSURE: 144 MMHG | HEART RATE: 75 BPM | DIASTOLIC BLOOD PRESSURE: 81 MMHG

## 2024-03-07 DIAGNOSIS — K40.90 UNILATERAL INGUINAL HERNIA, W/OUT OBSTRUCTION OR GANGRENE, NOT SPECIFIED AS RECURRENT: ICD-10-CM

## 2024-03-07 DIAGNOSIS — Z85.46 PERSONAL HISTORY OF MALIGNANT NEOPLASM OF PROSTATE: ICD-10-CM

## 2024-03-07 PROCEDURE — 99202 OFFICE O/P NEW SF 15 MIN: CPT

## 2024-03-07 RX ORDER — METOPROLOL SUCCINATE 25 MG/1
25 TABLET, EXTENDED RELEASE ORAL
Qty: 90 | Refills: 0 | Status: ACTIVE | COMMUNITY
Start: 2024-02-07

## 2024-03-07 RX ORDER — CHOLECALCIFEROL (VITAMIN D3) 1250 MCG
1.25 MG CAPSULE ORAL
Qty: 2 | Refills: 0 | Status: ACTIVE | COMMUNITY
Start: 2024-01-22

## 2024-03-07 RX ORDER — AMLODIPINE BESYLATE 5 MG/1
5 TABLET ORAL
Qty: 90 | Refills: 0 | Status: ACTIVE | COMMUNITY
Start: 2024-02-07

## 2024-03-07 NOTE — REVIEW OF SYSTEMS
[Easy Bleeding] : a tendency for easy bleeding [As Noted in HPI] : as noted in HPI [Easy Bruising] : a tendency for easy bruising [Negative] : Endocrine [FreeTextEntry2] : dec appetite, weigh stable [de-identified] : On Eliquis and ASA [FreeTextEntry8] : h/o Nephrolithiasis, passed without intervention

## 2024-03-07 NOTE — HISTORY OF PRESENT ILLNESS
[de-identified] : right groin swelling and discomfort [de-identified] : This 85 yo M with Significant PMH presents with h/o right groin swelling and occasional discomfort over one year.  Patient is able to push back swelling, when it occurs.  He was told he had a RIH and purchased a hernia belt, but never used it.  Patient denies fever, chills, nausea, vomiting or change in bowel and bladder habits.  ** Patient on Eliquis for h/o Chronic A. Fib -> Pending Cardioversion 03/22/2024 Patient with h/o PTCA and stenting x 3, on ASA 81 mg  Ab Surgeries: LIH Repair h/o Colostomy and Reversal s/p Ab Trauma when 16 yo

## 2024-03-07 NOTE — PHYSICAL EXAM
[Normal Breath Sounds] : Normal breath sounds [Normal Heart Sounds] : normal heart sounds [Alert] : alert [Oriented to Person] : oriented to person [Oriented to Place] : oriented to place [Oriented to Time] : oriented to time [Calm] : calm [de-identified] : Normal BS, soft, prior surgical incisions clean and closed [de-identified] : Elderly white male ambulating with st cane in NAD [de-identified] : calves soft, non-tender [de-identified] : palpable RIH, soft and easily reducible, no palpable hernia left

## 2024-03-07 NOTE — PLAN
[FreeTextEntry1] : -Explain the S&S of incarceration and strangulation and importance of calling the office, should this occur. -Encourage patient to wear hernia belt (truss) when OOB and active. -Due to significant PMH and upcoming Cardioversion for Tx of A. Fib, would recommend conservative management of RIH at this time.  Patient agrees with plan. -Follow up in one month and instructed to bring hernia belt.

## 2024-03-21 ENCOUNTER — TRANSCRIPTION ENCOUNTER (OUTPATIENT)
Age: 84
End: 2024-03-21

## 2024-03-21 ENCOUNTER — RESULT REVIEW (OUTPATIENT)
Age: 84
End: 2024-03-21

## 2024-03-21 ENCOUNTER — OUTPATIENT (OUTPATIENT)
Dept: OUTPATIENT SERVICES | Facility: HOSPITAL | Age: 84
LOS: 1 days | End: 2024-03-21
Payer: COMMERCIAL

## 2024-03-21 VITALS
SYSTOLIC BLOOD PRESSURE: 177 MMHG | RESPIRATION RATE: 16 BRPM | HEIGHT: 71 IN | HEART RATE: 76 BPM | WEIGHT: 164.91 LBS | DIASTOLIC BLOOD PRESSURE: 83 MMHG | TEMPERATURE: 98 F | OXYGEN SATURATION: 99 %

## 2024-03-21 VITALS
SYSTOLIC BLOOD PRESSURE: 160 MMHG | HEART RATE: 60 BPM | DIASTOLIC BLOOD PRESSURE: 74 MMHG | RESPIRATION RATE: 16 BRPM | OXYGEN SATURATION: 97 %

## 2024-03-21 DIAGNOSIS — Z98.89 OTHER SPECIFIED POSTPROCEDURAL STATES: Chronic | ICD-10-CM

## 2024-03-21 DIAGNOSIS — Z95.5 PRESENCE OF CORONARY ANGIOPLASTY IMPLANT AND GRAFT: Chronic | ICD-10-CM

## 2024-03-21 DIAGNOSIS — I48.91 UNSPECIFIED ATRIAL FIBRILLATION: ICD-10-CM

## 2024-03-21 LAB
ANION GAP SERPL CALC-SCNC: 11 MMOL/L — SIGNIFICANT CHANGE UP (ref 5–17)
BUN SERPL-MCNC: 36.7 MG/DL — HIGH (ref 8–20)
CALCIUM SERPL-MCNC: 9.4 MG/DL — SIGNIFICANT CHANGE UP (ref 8.4–10.5)
CHLORIDE SERPL-SCNC: 104 MMOL/L — SIGNIFICANT CHANGE UP (ref 96–108)
CO2 SERPL-SCNC: 27 MMOL/L — SIGNIFICANT CHANGE UP (ref 22–29)
CREAT SERPL-MCNC: 1.5 MG/DL — HIGH (ref 0.5–1.3)
EGFR: 46 ML/MIN/1.73M2 — LOW
GLUCOSE SERPL-MCNC: 170 MG/DL — HIGH (ref 70–99)
HCT VFR BLD CALC: 41.7 % — SIGNIFICANT CHANGE UP (ref 39–50)
HGB BLD-MCNC: 14.1 G/DL — SIGNIFICANT CHANGE UP (ref 13–17)
INR BLD: 1.39 RATIO — HIGH (ref 0.85–1.18)
MAGNESIUM SERPL-MCNC: 1.6 MG/DL — SIGNIFICANT CHANGE UP (ref 1.6–2.6)
MCHC RBC-ENTMCNC: 32.8 PG — SIGNIFICANT CHANGE UP (ref 27–34)
MCHC RBC-ENTMCNC: 33.8 GM/DL — SIGNIFICANT CHANGE UP (ref 32–36)
MCV RBC AUTO: 97 FL — SIGNIFICANT CHANGE UP (ref 80–100)
PLATELET # BLD AUTO: 122 K/UL — LOW (ref 150–400)
POTASSIUM SERPL-MCNC: 4.1 MMOL/L — SIGNIFICANT CHANGE UP (ref 3.5–5.3)
POTASSIUM SERPL-SCNC: 4.1 MMOL/L — SIGNIFICANT CHANGE UP (ref 3.5–5.3)
PROTHROM AB SERPL-ACNC: 15.3 SEC — HIGH (ref 9.5–13)
RBC # BLD: 4.3 M/UL — SIGNIFICANT CHANGE UP (ref 4.2–5.8)
RBC # FLD: 13.1 % — SIGNIFICANT CHANGE UP (ref 10.3–14.5)
SODIUM SERPL-SCNC: 142 MMOL/L — SIGNIFICANT CHANGE UP (ref 135–145)
WBC # BLD: 5.62 K/UL — SIGNIFICANT CHANGE UP (ref 3.8–10.5)
WBC # FLD AUTO: 5.62 K/UL — SIGNIFICANT CHANGE UP (ref 3.8–10.5)

## 2024-03-21 PROCEDURE — 36415 COLL VENOUS BLD VENIPUNCTURE: CPT

## 2024-03-21 PROCEDURE — 93325 DOPPLER ECHO COLOR FLOW MAPG: CPT | Mod: 26

## 2024-03-21 PROCEDURE — 80048 BASIC METABOLIC PNL TOTAL CA: CPT

## 2024-03-21 PROCEDURE — 93320 DOPPLER ECHO COMPLETE: CPT | Mod: 26

## 2024-03-21 PROCEDURE — 85610 PROTHROMBIN TIME: CPT

## 2024-03-21 PROCEDURE — 85027 COMPLETE CBC AUTOMATED: CPT

## 2024-03-21 PROCEDURE — 93325 DOPPLER ECHO COLOR FLOW MAPG: CPT

## 2024-03-21 PROCEDURE — 93312 ECHO TRANSESOPHAGEAL: CPT

## 2024-03-21 PROCEDURE — 92960 CARDIOVERSION ELECTRIC EXT: CPT

## 2024-03-21 PROCEDURE — 93005 ELECTROCARDIOGRAM TRACING: CPT

## 2024-03-21 PROCEDURE — 93010 ELECTROCARDIOGRAM REPORT: CPT

## 2024-03-21 PROCEDURE — 83735 ASSAY OF MAGNESIUM: CPT

## 2024-03-21 PROCEDURE — 93312 ECHO TRANSESOPHAGEAL: CPT | Mod: 26

## 2024-03-21 PROCEDURE — 93320 DOPPLER ECHO COMPLETE: CPT

## 2024-03-21 RX ORDER — MAGNESIUM SULFATE 500 MG/ML
2 VIAL (ML) INJECTION ONCE
Refills: 0 | Status: COMPLETED | OUTPATIENT
Start: 2024-03-21 | End: 2024-03-21

## 2024-03-21 RX ORDER — OXYBUTYNIN CHLORIDE 5 MG
1 TABLET ORAL
Refills: 0 | DISCHARGE

## 2024-03-21 RX ORDER — FENOFIBRATE,MICRONIZED 130 MG
1 CAPSULE ORAL
Refills: 0 | DISCHARGE

## 2024-03-21 RX ORDER — HYDRALAZINE HCL 50 MG
5 TABLET ORAL ONCE
Refills: 0 | Status: COMPLETED | OUTPATIENT
Start: 2024-03-21 | End: 2024-03-21

## 2024-03-21 RX ORDER — METOPROLOL TARTRATE 50 MG
0.5 TABLET ORAL
Qty: 0 | Refills: 0 | DISCHARGE
Start: 2024-03-21

## 2024-03-21 RX ORDER — SIMVASTATIN 20 MG/1
1 TABLET, FILM COATED ORAL
Refills: 0 | DISCHARGE

## 2024-03-21 RX ORDER — CHOLECALCIFEROL (VITAMIN D3) 125 MCG
1 CAPSULE ORAL
Refills: 0 | DISCHARGE

## 2024-03-21 RX ORDER — AMIODARONE HYDROCHLORIDE 400 MG/1
1 TABLET ORAL
Qty: 60 | Refills: 0
Start: 2024-03-21 | End: 2024-04-03

## 2024-03-21 RX ORDER — AMIODARONE HYDROCHLORIDE 400 MG/1
200 TABLET ORAL
Refills: 0 | Status: DISCONTINUED | OUTPATIENT
Start: 2024-03-21 | End: 2024-04-04

## 2024-03-21 RX ORDER — APIXABAN 2.5 MG/1
1 TABLET, FILM COATED ORAL
Refills: 0 | DISCHARGE

## 2024-03-21 RX ORDER — APIXABAN 2.5 MG/1
5 TABLET, FILM COATED ORAL ONCE
Refills: 0 | Status: COMPLETED | OUTPATIENT
Start: 2024-03-21 | End: 2024-03-21

## 2024-03-21 RX ORDER — AMLODIPINE BESYLATE 2.5 MG/1
1 TABLET ORAL
Refills: 0 | DISCHARGE

## 2024-03-21 RX ORDER — METOPROLOL TARTRATE 50 MG
1 TABLET ORAL
Refills: 0 | DISCHARGE

## 2024-03-21 RX ADMIN — Medication 5 MILLIGRAM(S): at 13:30

## 2024-03-21 RX ADMIN — AMIODARONE HYDROCHLORIDE 200 MILLIGRAM(S): 400 TABLET ORAL at 12:46

## 2024-03-21 RX ADMIN — Medication 50 GRAM(S): at 11:41

## 2024-03-21 RX ADMIN — APIXABAN 5 MILLIGRAM(S): 2.5 TABLET, FILM COATED ORAL at 10:26

## 2024-03-21 RX ADMIN — Medication 20 MILLIGRAM(S): at 12:48

## 2024-03-21 NOTE — DISCHARGE NOTE PROVIDER - NSDCFUADDAPPT_GEN_ALL_CORE_FT
Our office will contact you in 3-5 days to schedule this appointment. Please call [phone] with questions or concerns. Our office will contact you in 3-5 days to schedule this appointment. Please call [phone] with questions or concerns.    Continue with Eliquis TWICE daily WITHOUT any interruptions.  Our office will contact you in 3-5 days to schedule this appointment. Please call [phone] with questions or concerns.    Continue with Eliquis TWICE daily WITHOUT any interruptions.   Start Amiodarone 200mg twice daily x 14 days then decrease to once daily.  Our office will contact you in 3-5 days to schedule this appointment. Please call [phone] with questions or concerns.    Continue with Eliquis TWICE daily WITHOUT any interruptions.   Start Amiodarone 200mg twice daily x 14 days then decrease to once daily.   Decrease your Metoprolol to 12.5mg (half a tablet) daily

## 2024-03-21 NOTE — H&P PST ADULT - ASSESSMENT
85yo male with history of persistent atrial fibrillation, CAD s/p 3 stents (~2008), HTN, PAD, CKD, and prostate CA. He was diagnosed with AF first in 12/2023 while hospitalized. EF was preserved on TTE in 12/2023 (EF 60%). He notes some fatigue and shortness of breath with activity. He now presents electively for a PORFIRIO guided cardioversion in hopes to restore sinus rhythm.     Plan:  Consent w/ Attending  Stat labs & ECG  NPO ****  Last AC **** 83yo male with history of persistent atrial fibrillation, CAD s/p 3 stents (~2008), HTN, PAD, CKD, and prostate CA. He was diagnosed with AF first in 12/2023 while hospitalized. EF was preserved on TTE in 12/2023 (EF 60%). He notes some fatigue and shortness of breath with activity. He now presents electively for a PORFIRIO guided cardioversion in hopes to restore sinus rhythm.     Plan:  Consent w/ Attending  Stat labs & ECG  NPO confirmed   Last eliquis dose yesterday morning.  83yo male with history of persistent atrial fibrillation, CAD s/p 3 stents (~2008), HTN, PAD, CKD, and prostate CA. He was diagnosed with AF first in 12/2023 while hospitalized. EF was preserved on TTE in 12/2023 (EF 60%). He notes some fatigue and shortness of breath with activity. He now presents electively for a PORFIRIO guided cardioversion in hopes to restore sinus rhythm.     Plan:  Consent w/ Attending  Stat labs & ECG  NPO confirmed   Last eliquis dose yesterday morning. Will give AM dose now. Pt and patient's daughter educated on importance of medication (especially anticoagulant) compliance.

## 2024-03-21 NOTE — H&P PST ADULT - COMMENTS
Admission
Denies fevers, chills, cp, palp, dizziness, syncope, abdominal pain, N/V/D, hematuria, bloody and or dark stools     + fatigue + DAVIS

## 2024-03-21 NOTE — DISCHARGE NOTE NURSING/CASE MANAGEMENT/SOCIAL WORK - NSDCPEFALRISK_GEN_ALL_CORE
For information on Fall & Injury Prevention, visit: [email or web address] OR  [email or web address] OR  https://www.cdc.gov/steadi/patient.html

## 2024-03-21 NOTE — H&P PST ADULT - HISTORY OF PRESENT ILLNESS
83yo male with history of persistent atrial fibrillation, CAD s/p 3 stents (~2008), HTN, PAD, CKD, and prostate CA. He was diagnosed with AF first in 12/2023 while hospitalized. EF was preserved on TTE in 12/2023 (EF 60%). He notes some fatigue and shortness of breath with activity. He now presents electively for a PORFIRIO guided cardioversion in hopes to restore sinus rhythm.     Cardiology Summary  ECG: 3/5/23: atrial fibrillation.   Echo: 12/2023: EF 60%  83yo male with history of persistent atrial fibrillation, CAD s/p 3 stents (~2008), HTN, PAD, CKD, and prostate CA s/p radiation. He was diagnosed with AF first in 12/2023 while hospitalized. EF was preserved on TTE in 12/2023 (EF 60%). He notes some fatigue and shortness of breath with activity. He now presents electively for a PORFIRIO guided cardioversion in hopes to restore sinus rhythm.     Cardiology Summary  ECG: 3/5/23: atrial fibrillation.   Echo: 12/2023: EF 60%

## 2024-03-21 NOTE — DISCHARGE NOTE PROVIDER - CARE PROVIDER_API CALL
Jean Claude Marques  Cardiac Electrophysiology  [address] [facility], [address] [address]  Phone: [phone]  Fax: [phone]  Follow Up Time:     Les Gentile  Cardiovascular Disease  [address] [place] [address], [address] [address]  Phone: [phone]  Fax: [phone]  Follow Up Time:

## 2024-03-21 NOTE — DISCHARGE NOTE PROVIDER - NSDCMRMEDTOKEN_GEN_ALL_CORE_FT
amLODIPine 10 mg oral tablet: 1 tab(s) orally once a day  aspirin 81 mg oral delayed release tablet: 1 tab(s) orally 2 times a day  D3-50 1250 mcg (50,000 intl units) oral capsule: 1 cap(s) orally every 2 weeks  Eliquis 5 mg oral tablet: 1 tab(s) orally 2 times a day  enalapril 20 mg oral tablet: 1 tab(s) orally once a day  fenofibrate 134 mg oral capsule: 1 cap(s) orally once a day  fenofibrate 145 mg oral tablet: 1 tab(s) orally once a day (at bedtime)  metoprolol succinate 25 mg oral tablet, extended release: 1 tab(s) orally once a day  oxyBUTYnin 10 mg/24 hr oral tablet, extended release: 1 tab(s) orally once a day  simvastatin 20 mg oral tablet: 1 tab(s) orally once a day (at bedtime)  tamsulosin 0.4 mg oral capsule: 1 cap(s) orally once a day (at bedtime)   amLODIPine 10 mg oral tablet: 1 tab(s) orally once a day  aspirin 81 mg oral delayed release tablet: 1 tab(s) orally 2 times a day  D3-50 1250 mcg (50,000 intl units) oral capsule: 1 cap(s) orally every 2 weeks  Eliquis 5 mg oral tablet: 1 tab(s) orally 2 times a day  enalapril 20 mg oral tablet: 1 tab(s) orally once a day  fenofibrate 145 mg oral tablet: 1 tab(s) orally once a day (at bedtime)  metoprolol succinate 25 mg oral tablet, extended release: 1 tab(s) orally once a day  oxyBUTYnin 10 mg/24 hr oral tablet, extended release: 1 tab(s) orally once a day  simvastatin 20 mg oral tablet: 1 tab(s) orally once a day (at bedtime)  tamsulosin 0.4 mg oral capsule: 1 cap(s) orally once a day (at bedtime)   amiodarone 200 mg oral tablet: 1 tab(s) orally 2 times a day 1 tablet twice daily x 14 days then decrease to once daily  amLODIPine 10 mg oral tablet: 1 tab(s) orally once a day  aspirin 81 mg oral delayed release tablet: 1 tab(s) orally 2 times a day  D3-50 1250 mcg (50,000 intl units) oral capsule: 1 cap(s) orally every 2 weeks  Eliquis 5 mg oral tablet: 1 tab(s) orally 2 times a day  enalapril 20 mg oral tablet: 1 tab(s) orally once a day  fenofibrate 145 mg oral tablet: 1 tab(s) orally once a day (at bedtime)  metoprolol succinate 25 mg oral tablet, extended release: 1 tab(s) orally once a day  oxyBUTYnin 10 mg/24 hr oral tablet, extended release: 1 tab(s) orally once a day  simvastatin 20 mg oral tablet: 1 tab(s) orally once a day (at bedtime)  tamsulosin 0.4 mg oral capsule: 1 cap(s) orally once a day (at bedtime)   amiodarone 200 mg oral tablet: 1 tab(s) orally 2 times a day 1 tablet twice daily x 14 days then decrease to once daily  amLODIPine 10 mg oral tablet: 1 tab(s) orally once a day  aspirin 81 mg oral delayed release tablet: 1 tab(s) orally 2 times a day  D3-50 1250 mcg (50,000 intl units) oral capsule: 1 cap(s) orally every 2 weeks  Eliquis 5 mg oral tablet: 1 tab(s) orally 2 times a day  enalapril 20 mg oral tablet: 1 tab(s) orally once a day  fenofibrate 145 mg oral tablet: 1 tab(s) orally once a day (at bedtime)  Metoprolol Succinate ER 25 mg oral tablet, extended release: 0.5 tab(s) orally once a day  oxyBUTYnin 10 mg/24 hr oral tablet, extended release: 1 tab(s) orally once a day  simvastatin 20 mg oral tablet: 1 tab(s) orally once a day (at bedtime)  tamsulosin 0.4 mg oral capsule: 1 cap(s) orally once a day (at bedtime)

## 2024-03-21 NOTE — DISCHARGE NOTE PROVIDER - NSDCFUADDINST_GEN_ALL_CORE_FT
Our office will contact you in 3-5 days to schedule this appointment. Please call [phone] with questions or concerns.    Continue with Eliquis TWICE daily WITHOUT any interruptions.  Our office will contact you in 3-5 days to schedule this appointment. Please call [phone] with questions or concerns.  Start Amiodarone 200mg twice daily x 14 days then decrease to once daily.   Continue with Eliquis TWICE daily WITHOUT any interruptions.  Our office will contact you in 3-5 days to schedule this appointment. Please call [phone] with questions or concerns.  Start Amiodarone 200mg twice daily x 14 days then decrease to once daily.   Continue with Eliquis TWICE daily WITHOUT any interruptions.   Decrease your Metoprolol to 12.5mg (half a tablet) daily

## 2024-03-21 NOTE — DISCHARGE NOTE NURSING/CASE MANAGEMENT/SOCIAL WORK - NSDCFUADDAPPT_GEN_ALL_CORE_FT
Our office will contact you in 3-5 days to schedule this appointment. Please call [phone] with questions or concerns.    Continue with Eliquis TWICE daily WITHOUT any interruptions.   Start Amiodarone 200mg twice daily x 14 days then decrease to once daily.

## 2024-03-21 NOTE — DISCHARGE NOTE NURSING/CASE MANAGEMENT/SOCIAL WORK - PATIENT PORTAL LINK FT
You can access the FollowMyHealth Patient Portal offered by [facility] by registering at the following website: [email or web address]. By joining [facility]’s FollowMyHealth portal, you will also be able to view your health information using other applications (apps) compatible with our system.

## 2024-03-21 NOTE — DISCHARGE NOTE NURSING/CASE MANAGEMENT/SOCIAL WORK - NSDCVIVACCINE_GEN_ALL_CORE_FT
Tdap; 27-Jul-2023 22:12; Gabriella Bolden (RN); [employer]; [id number] (Exp. Date: 01-May-2025); IntraMuscular; Deltoid Left.; 0.5 milliLiter(s); VIS (VIS Published: 09-May-2013, VIS Presented: 27-Jul-2023);

## 2024-03-21 NOTE — H&P PST ADULT - HEIGHT IN INCHES
encouraged patient to verbalize feelings and concerns. Encouraged pt to seek out to staff when having suicidal thoughts. 11

## 2024-03-21 NOTE — H&P PST ADULT - NSICDXPASTMEDICALHX_GEN_ALL_CORE_FT
PAST MEDICAL HISTORY:  CAD (coronary artery disease)     Chronic kidney disease (CKD)     History of peristent atrial fibrillation     Hypercholesterolemia     Hypertension     PAD (peripheral artery disease)     Prostate cancer

## 2024-03-21 NOTE — PROGRESS NOTE ADULT - SUBJECTIVE AND OBJECTIVE BOX
Pt doing well s/p uncomplicated PORFIRIO & DCCV (300J x 1) with restoration of sinus rhythm. Denies complaint.     PORFIRIO: No thrombus (official report pending)  EKG: SR ~60bpm w/ prolonged AV delay (IN 250ms), QRSD 122ms (LAFB), APCs    Exam:   VSS, NAD, Awake and alert  Skin: no erythema/edema/blistering at defib pad sites.   Card: S1/S2, RRR, no m/g/r  Resp: lungs CTA b/l  Abd: S/NT/ND  Ext: no edema, distal pulses intact    Assessment:   85yo male with history of persistent atrial fibrillation, CAD s/p 3 stents (~2008), HTN, PAD, CKD, and prostate CA s/p radiation. He was diagnosed with AF first in 12/2023 while hospitalized. EF was preserved on TTE in 12/2023 (EF 60%). He notes some fatigue and shortness of breath with activity. He presented electively today for and is now status post PORFIRIO negative for HAKAN thrombus and uncomplicated DCCV with restoration of sinus rhythm.     Plan:   Observation on telemetry per post op protocol.    Resume PO intake.   Ambulate w/ assist once fully awake & back to baseline mental status w/ VSS.  Continue eliquis. Importance of strict compliance with anticoagulation regimen reinforced with pt.   Resume other home medications.   Anticipate d/c home once all criteria met, with outpt f/up in 1 month.    Pt doing well s/p uncomplicated PORFIRIO & DCCV (300J x 1) with restoration of sinus rhythm. Denies complaint.     PORFIRIO: No thrombus (official report pending)  EKG: SR ~60bpm w/ prolonged AV delay (PA 250ms), QRSD 122ms (LAFB), APCs    Exam:   VSS, NAD, Awake and alert  Skin: no erythema/edema/blistering at defib pad sites.   Card: S1/S2, RRR, no m/g/r  Resp: lungs CTA b/l  Abd: S/NT/ND  Ext: no edema, distal pulses intact    Assessment:   85yo male with history of persistent atrial fibrillation, CAD s/p 3 stents (~2008), HTN, PAD, CKD, and prostate CA s/p radiation. He was diagnosed with AF first in 12/2023 while hospitalized. EF was preserved on TTE in 12/2023 (EF 60%). He notes some fatigue and shortness of breath with activity. He presented electively today for and is now status post PORFIRIO negative for HAKAN thrombus and uncomplicated DCCV with restoration of sinus rhythm.     Plan:   Observation on telemetry per post op protocol.    Resume PO intake.   Ambulate w/ assist once fully awake & back to baseline mental status w/ VSS.  Continue eliquis. Importance of strict compliance with anticoagulation regimen reinforced with pt.   Start Amiodarone 200mg twice wayne x 14 days then decrease to 200mg daily thereafter.   Resume other home medications.   Anticipate d/c home once all criteria met, with outpt f/up in 1 month.    Pt doing well s/p uncomplicated PORFIRIO & DCCV (300J x 1) with restoration of sinus rhythm. Denies complaint.     PORFIRIO: No thrombus (official report pending)  EKG: SR ~60bpm w/ prolonged AV delay (VT 250ms), QRSD 122ms (LAFB), APCs    Exam:   VSS, NAD, Awake and alert  Skin: no erythema/edema/blistering at defib pad sites.   Card: S1/S2, RRR, no m/g/r  Resp: lungs CTA b/l  Abd: S/NT/ND  Ext: no edema, distal pulses intact    Assessment:   83yo male with history of persistent atrial fibrillation, CAD s/p 3 stents (~2008), HTN, PAD, CKD, and prostate CA s/p radiation. He was diagnosed with AF first in 12/2023 while hospitalized. EF was preserved on TTE in 12/2023 (EF 60%). He notes some fatigue and shortness of breath with activity. He presented electively today for and is now status post PORFIRIO negative for HAKAN thrombus and uncomplicated DCCV with restoration of sinus rhythm.     Plan:   Observation on telemetry per post op protocol.    Resume PO intake.   Ambulate w/ assist once fully awake & back to baseline mental status w/ VSS.  Continue eliquis. Importance of strict compliance with anticoagulation regimen reinforced with pt.   Start Amiodarone 200mg twice wayne x 14 days then decrease to 200mg daily thereafter.   Decrease Metoprolol to 12.5mg daily.   Resume other home medications.   Anticipate d/c home once all criteria met, with outpt f/up in 1 month.

## 2024-03-21 NOTE — DISCHARGE NOTE PROVIDER - NSDCCPTREATMENT_GEN_ALL_CORE_FT
PRINCIPAL PROCEDURE  Procedure: Echocardiogram, transesophageal, with external cardioversion  Findings and Treatment: -Take each dose of your anticoagulation medication (blood thinner) exactly as prescribed.   -Resume your diet with soft foods first.  - Do not drive, operate heavy machinery or make important decisions for 24 hours following the procedure.  - You may resume all other activities the day after the procedure.  Call your doctor if:   -you develop a fever, cough up blood, have any chest pain, palpitations or difficulty breathing. You may take a throat lozenge if you develop a sore throat or try warm salt water gargle.   - you have any questions or concerns regarding the procedure.  If you experience increased difficulty breathing or chest pain, or if you faint, have dizzy spells, or for any other distressing symptom, please seek immediate medical attention.

## 2024-03-21 NOTE — H&P PST ADULT - NSICDXPASTSURGICALHX_GEN_ALL_CORE_FT
PAST SURGICAL HISTORY:  H/O colostomy Colostomy & Reversal 1956    H/O hernia repair     H/O lithotripsy     Status post cardiac catheterization 2001 [facility]    Status post coronary artery stent placement X 3 2001 [facility]

## 2024-03-21 NOTE — DISCHARGE NOTE PROVIDER - HOSPITAL COURSE
85yo male with history of persistent atrial fibrillation, CAD s/p 3 stents (~2008), HTN, PAD, CKD, and prostate CA s/p radiation. He was diagnosed with AF first in 12/2023 while hospitalized. EF was preserved on TTE in 12/2023 (EF 60%). He notes some fatigue and shortness of breath with activity. He presented electively today for and is now status post PORFIRIO negative for HAKAN thrombus and uncomplicated DCCV with restoration of sinus rhythm.     Plan:   Observation on telemetry per post op protocol.    Resume PO intake.   Ambulate w/ assist once fully awake & back to baseline mental status w/ VSS.  Continue eliquis. Importance of strict compliance with anticoagulation regimen reinforced with pt.   Resume other home medications.   Anticipate d/c home once all criteria met, with outpt f/up in 1 month. 85yo male with history of persistent atrial fibrillation, CAD s/p 3 stents (~2008), HTN, PAD, CKD, and prostate CA s/p radiation. He was diagnosed with AF first in 12/2023 while hospitalized. EF was preserved on TTE in 12/2023 (EF 60%). He notes some fatigue and shortness of breath with activity. He presented electively today for and is now status post PORFIRIO negative for HAKAN thrombus and uncomplicated DCCV with restoration of sinus rhythm.

## 2024-03-21 NOTE — DISCHARGE NOTE PROVIDER - CARE PROVIDERS DIRECT ADDRESSES
[email or web address]

## 2024-03-22 ENCOUNTER — APPOINTMENT (OUTPATIENT)
Dept: CARDIOLOGY | Facility: CLINIC | Age: 84
End: 2024-03-22

## 2024-03-22 PROBLEM — I25.10 ATHEROSCLEROTIC HEART DISEASE OF NATIVE CORONARY ARTERY WITHOUT ANGINA PECTORIS: Chronic | Status: ACTIVE | Noted: 2024-03-21

## 2024-03-22 PROBLEM — Z86.79 PERSONAL HISTORY OF OTHER DISEASES OF THE CIRCULATORY SYSTEM: Chronic | Status: ACTIVE | Noted: 2024-03-21

## 2024-03-22 PROBLEM — N18.9 CHRONIC KIDNEY DISEASE, UNSPECIFIED: Chronic | Status: ACTIVE | Noted: 2024-03-21

## 2024-03-22 PROBLEM — I73.9 PERIPHERAL VASCULAR DISEASE, UNSPECIFIED: Chronic | Status: ACTIVE | Noted: 2024-03-21

## 2024-05-03 ENCOUNTER — APPOINTMENT (OUTPATIENT)
Dept: ELECTROPHYSIOLOGY | Facility: CLINIC | Age: 84
End: 2024-05-03
Payer: MEDICARE

## 2024-05-03 DIAGNOSIS — I48.19 OTHER PERSISTENT ATRIAL FIBRILLATION: ICD-10-CM

## 2024-05-03 DIAGNOSIS — I10 ESSENTIAL (PRIMARY) HYPERTENSION: ICD-10-CM

## 2024-05-03 PROCEDURE — 99214 OFFICE O/P EST MOD 30 MIN: CPT | Mod: 25

## 2024-05-03 PROCEDURE — 93000 ELECTROCARDIOGRAM COMPLETE: CPT

## 2024-05-03 RX ORDER — AMLODIPINE BESYLATE 10 MG/1
10 TABLET ORAL DAILY
Qty: 90 | Refills: 0 | Status: DISCONTINUED | COMMUNITY
Start: 1900-01-01 | End: 2024-05-03

## 2024-05-03 RX ORDER — METOPROLOL SUCCINATE 25 MG/1
25 TABLET, EXTENDED RELEASE ORAL
Qty: 90 | Refills: 1 | Status: ACTIVE | COMMUNITY
Start: 2024-05-03 | End: 1900-01-01

## 2024-05-03 NOTE — HISTORY OF PRESENT ILLNESS
[FreeTextEntry1] : The patient is an 84-year-old male referred for arrhythmia management. The patient has a history of persistent atrial fibrillation, CAD with 3 stents (16 years back), HTN, PAD, CKD, and prostate CA. He was diagnosed with AF first in 12/2023 while hospitalized. EF was preserved on TTE in 12/2023 (EF 60%). He notes some shortness of breath with activity which he states was present even before the diagnosis of AF. DCCV was recommended to clarify if symptomatic benefit in SR.  On 3/21/24 underwent successful electrical cardioversion of atrial fibrillation to sinus rhythm. Maintained on Eliquis 5mg po q12hr, amiodarone 200mg po BID x 14days>>>start 200mg po daily and Toprol 12.5mg po daily post-DCCV.  Now presents for follow-up and reports feeling well. He does not note any symptomatic improvement in SR. Denies palpitations, fatigue. Experiences stable chronic mild DAVIS with no improvement since cardioversion. Tolerating Eliquis without c/o easy bruising, bleeding, or falls. ECG reveals SR with APCs.  Maintained on amiodarone 200mg daily.

## 2024-05-03 NOTE — REVIEW OF SYSTEMS
[Dyspnea on exertion] : dyspnea during exertion [Feeling Fatigued] : not feeling fatigued [SOB] : no shortness of breath [Chest Discomfort] : no chest discomfort [Syncope] : no syncope [Dizziness] : no dizziness [Easy Bleeding] : no tendency for easy bleeding

## 2024-05-03 NOTE — DISCUSSION/SUMMARY
[FreeTextEntry1] : The patient is an 84-year-old male referred for arrhythmia management. The patient has a history of persistent atrial fibrillation, CAD with 3 stents (16 years back), HTN, PAD, CKD, and prostate CA. He was diagnosed with AF first in 12/2023 while hospitalized. EF was preserved on TTE in 12/2023 (EF 60%). He notes some shortness of breath with activity which he states was present even before the diagnosis of AF. DCCV was recommended to clarify if symptomatic benefit in SR.  On 3/21/24 underwent successful electrical cardioversion of atrial fibrillation to sinus rhythm. Maintained on Eliquis 5mg po q12hr, amiodarone 200mg po BID x 14days>>>start 200mg po daily and Toprol 12.5mg po daily post-DCCV.  Now presents for follow-up and reports feeling well. He does not note any symptomatic improvement in SR. Denies palpitations, fatigue. Experiences stable chronic mild DAVIS with no improvement since cardioversion. Tolerating Eliquis without c/o easy bruising, bleeding, or falls. ECG reveals SR with APCs.  Maintained on amiodarone 200mg daily  Recommendations:  Mr. Marinelli has been feeling well since DCCV and reports no symptomatic improvement in SR. We discussed amiodarone and the potential long term toxicities. Give preserved EF, adequate rate control in AF, and now no symptomatic improvement in SR recommend discontinuing amiodarone and continuing with rate control only. Rhythm control strategies including catheter ablation were discussed with patient and due to no symptomatic improvement he is agreeable to continue with rate control only. Continue Eliquis for stroke prophlaxis. Increase toprol back to 25mg daily once amiodarone stopped. EP follow up in 3 months to ensure no change in symptoms then likely may f/u with cardiology with EP PRN.  Jenny Zepeda ANP-C [EKG obtained to assist in diagnosis and management of assessed problem(s)] : EKG obtained to assist in diagnosis and management of assessed problem(s)

## 2024-07-22 ENCOUNTER — RX RENEWAL (OUTPATIENT)
Age: 84
End: 2024-07-22

## 2024-08-07 ENCOUNTER — APPOINTMENT (OUTPATIENT)
Dept: ELECTROPHYSIOLOGY | Facility: CLINIC | Age: 84
End: 2024-08-07

## 2024-08-07 ENCOUNTER — NON-APPOINTMENT (OUTPATIENT)
Age: 84
End: 2024-08-07

## 2024-08-07 PROCEDURE — 99214 OFFICE O/P EST MOD 30 MIN: CPT | Mod: 25

## 2024-08-07 PROCEDURE — 93000 ELECTROCARDIOGRAM COMPLETE: CPT

## 2024-08-07 NOTE — HISTORY OF PRESENT ILLNESS
[FreeTextEntry1] : The patient is an 84-year-old male presenting for follow up today. The patient has a history of persistent atrial fibrillation, CAD s/p PCI with 3 stents (16 years back), HTN, PAD, CKD, and prostate CA. He was diagnosed   To summarize, the patient was first diagnosed with AF in 12/2023 while hospitalized. EF was preserved on TTE in 12/2023 (EF 60%). He noted some shortness of breath with activity which he stated was present even before the diagnosis of AF. DCCV was recommended to clarify if symptomatic benefit in SR. On 3/21/24 the patient underwent successful electrical cardioversion of atrial fibrillation to sinus rhythm and was started on amiodarone for maintenance of sinus rhythm. However, he failed to notice any change in his symptoms. After discussion of possible long-term effects of amiodarone, the patient elected to continue it. He is currently on amiodarone 200 mg daily and toprol 12.5 mg daily (recently decreased from 25 mg daily). The patient denies any symptoms of palpitations, shortness of breath, dizziness, chest pain, syncope or extremity edema.

## 2024-08-07 NOTE — DISCUSSION/SUMMARY
[FreeTextEntry1] : In summary, the patient is an 84-year-old male with a history of persistent AF s/p DCCV maintained on amiodarone for rhythm control and additionally Toprol 12.5 mg daily. He is also on eliquis for stroke prophylaxis. He is maintaining normal sinus rhythm. He is bradycardic on his ECG today. I will stop Toprol and decrease the dose of amiodarone to 100 mg daily. He will continue eliquis as before.   f/u in 6 months.  [EKG obtained to assist in diagnosis and management of assessed problem(s)] : EKG obtained to assist in diagnosis and management of assessed problem(s)

## 2024-12-20 ENCOUNTER — OFFICE (OUTPATIENT)
Dept: URBAN - METROPOLITAN AREA CLINIC 94 | Facility: CLINIC | Age: 84
Setting detail: OPHTHALMOLOGY
End: 2024-12-20
Payer: COMMERCIAL

## 2024-12-20 DIAGNOSIS — H25.11: ICD-10-CM

## 2024-12-20 DIAGNOSIS — H43.393: ICD-10-CM

## 2024-12-20 DIAGNOSIS — H43.813: ICD-10-CM

## 2024-12-20 DIAGNOSIS — H25.13: ICD-10-CM

## 2024-12-20 DIAGNOSIS — E11.9: ICD-10-CM

## 2024-12-20 PROBLEM — H25.12 CATARACT SENILE NUCLEAR SCLEROSIS; RIGHT EYE, LEFT EYE, BOTH EYES: Status: ACTIVE | Noted: 2024-12-20

## 2024-12-20 PROCEDURE — 99214 OFFICE O/P EST MOD 30 MIN: CPT | Performed by: OPHTHALMOLOGY

## 2024-12-20 PROCEDURE — 92136 OPHTHALMIC BIOMETRY: CPT | Performed by: OPHTHALMOLOGY

## 2024-12-20 PROCEDURE — 92250 FUNDUS PHOTOGRAPHY W/I&R: CPT | Performed by: OPHTHALMOLOGY

## 2024-12-20 ASSESSMENT — KERATOMETRY
OD_K2POWER_DIOPTERS: 44.25
OS_AXISANGLE2_DEGREES: 172
OS_CYLPOWER_DEGREES: 1
OS_K1K2_AVERAGE: 43.75
OD_AXISANGLE2_DEGREES: 090
OD_CYLAXISANGLE_DEGREES: 180
OS_K1POWER_DIOPTERS: 43.25
OD_K1POWER_DIOPTERS: 44.25
OD_K1K2_AVERAGE: 44.25
OD_AXISANGLE_DEGREES: 180
OS_AXISANGLE_DEGREES: 82
OS_CYLAXISANGLE_DEGREES: 172
OS_K2POWER_DIOPTERS: 44.25

## 2024-12-20 ASSESSMENT — TONOMETRY
OS_IOP_MMHG: 18
OD_IOP_MMHG: 12

## 2024-12-20 ASSESSMENT — CONFRONTATIONAL VISUAL FIELD TEST (CVF)
OS_FINDINGS: FULL
OD_FINDINGS: FULL

## 2024-12-21 ASSESSMENT — REFRACTION_MANIFEST
OS_CYLINDER: -1.75
OD_AXIS: 095
OS_CYLINDER: -1.75
OD_VA1: 20/40-1
OS_AXIS: 083
OD_AXIS: 095
OS_VA1: 20/30-2
OD_CYLINDER: -1.25
OS_SPHERE: +1.50
OD_CYLINDER: -1.25
OS_VA1: 20/30
OD_CYLINDER: -1.25
OD_SPHERE: +1.25
OS_AXIS: 080
OS_SPHERE: +1.50
OS_VA1: 20/30-2
OD_ADD: +2.50
OD_VA1: 20/60
OD_SPHERE: +0.75
OS_ADD: +2.50
OD_VA1: 20/40-1
OS_SPHERE: +1.50
OS_CYLINDER: -1.75
OD_AXIS: 095
OD_SPHERE: +1.25
OS_AXIS: 080

## 2024-12-21 ASSESSMENT — KERATOMETRY
OD_K1POWER_DIOPTERS: 44.25
OD_K2POWER_DIOPTERS: 44.25
OS_K2POWER_DIOPTERS: 44.25
OD_AXISANGLE_DEGREES: 090
OS_AXISANGLE_DEGREES: 172
OS_K1POWER_DIOPTERS: 43.25

## 2024-12-21 ASSESSMENT — REFRACTION_AUTOREFRACTION
OD_CYLINDER: -1.25
OD_SPHERE: +0.75
OS_SPHERE: +1.50
OS_AXIS: 083
OS_CYLINDER: -1.75
OD_AXIS: 095

## 2024-12-21 ASSESSMENT — VISUAL ACUITY
OD_BCVA: 20/30-1
OS_BCVA: 20/60

## 2025-01-16 ENCOUNTER — APPOINTMENT (OUTPATIENT)
Dept: CARDIOLOGY | Facility: CLINIC | Age: 85
End: 2025-01-16

## 2025-01-16 VITALS
DIASTOLIC BLOOD PRESSURE: 68 MMHG | WEIGHT: 160 LBS | BODY MASS INDEX: 21.67 KG/M2 | SYSTOLIC BLOOD PRESSURE: 118 MMHG | OXYGEN SATURATION: 98 % | HEIGHT: 72 IN | HEART RATE: 45 BPM

## 2025-02-26 ENCOUNTER — APPOINTMENT (OUTPATIENT)
Dept: ELECTROPHYSIOLOGY | Facility: CLINIC | Age: 85
End: 2025-02-26
Payer: MEDICARE

## 2025-02-26 VITALS
HEIGHT: 72 IN | DIASTOLIC BLOOD PRESSURE: 61 MMHG | OXYGEN SATURATION: 100 % | BODY MASS INDEX: 21.67 KG/M2 | SYSTOLIC BLOOD PRESSURE: 111 MMHG | WEIGHT: 160 LBS | HEART RATE: 65 BPM

## 2025-02-26 DIAGNOSIS — I48.19 OTHER PERSISTENT ATRIAL FIBRILLATION: ICD-10-CM

## 2025-02-26 PROCEDURE — 99214 OFFICE O/P EST MOD 30 MIN: CPT | Mod: 25

## 2025-02-26 PROCEDURE — 93000 ELECTROCARDIOGRAM COMPLETE: CPT

## 2025-02-26 RX ORDER — TAMSULOSIN HYDROCHLORIDE 0.4 MG/1
0.4 CAPSULE ORAL DAILY
Refills: 0 | Status: ACTIVE | COMMUNITY

## 2025-07-29 ENCOUNTER — EMERGENCY (EMERGENCY)
Facility: HOSPITAL | Age: 85
LOS: 0 days | Discharge: HOME | End: 2025-07-30
Attending: STUDENT IN AN ORGANIZED HEALTH CARE EDUCATION/TRAINING PROGRAM
Payer: MEDICARE

## 2025-07-29 VITALS
WEIGHT: 179.9 LBS | HEART RATE: 69 BPM | TEMPERATURE: 98 F | RESPIRATION RATE: 18 BRPM | SYSTOLIC BLOOD PRESSURE: 140 MMHG | OXYGEN SATURATION: 100 % | DIASTOLIC BLOOD PRESSURE: 81 MMHG

## 2025-07-29 DIAGNOSIS — Z98.89 OTHER SPECIFIED POSTPROCEDURAL STATES: Chronic | ICD-10-CM

## 2025-07-29 DIAGNOSIS — T83.091A OTHER MECHANICAL COMPLICATION OF INDWELLING URETHRAL CATHETER, INITIAL ENCOUNTER: ICD-10-CM

## 2025-07-29 DIAGNOSIS — Z95.5 PRESENCE OF CORONARY ANGIOPLASTY IMPLANT AND GRAFT: Chronic | ICD-10-CM

## 2025-07-29 PROCEDURE — 99283 EMERGENCY DEPT VISIT LOW MDM: CPT

## 2025-07-29 PROCEDURE — 99282 EMERGENCY DEPT VISIT SF MDM: CPT

## 2025-07-29 NOTE — ED ADULT TRIAGE NOTE - CHIEF COMPLAINT QUOTE
patient states he had a Doll catheter placed yesterday, this evening he heard a snapping noise and noticed the catheter was out and he was bleeding from his penis.  no active bleeding now. patient denies pain ,

## 2025-07-30 VITALS
HEART RATE: 60 BPM | DIASTOLIC BLOOD PRESSURE: 70 MMHG | SYSTOLIC BLOOD PRESSURE: 160 MMHG | OXYGEN SATURATION: 100 % | TEMPERATURE: 98 F | RESPIRATION RATE: 16 BRPM

## 2025-07-30 NOTE — ED PROVIDER NOTE - CLINICAL SUMMARY MEDICAL DECISION MAKING FREE TEXT BOX
Attending Betsy Lamar MD: 85-year-old male with past medical history of prostate cancer in remission, A-fib, CAD status post stents, hypertension, PAD, recurrent urinary retention episodes requiring chronic Doll presents after his Doll accidentally fell out.  He reports the latex from the Doll catheter broke resulting in the Doll being removed.  He noted hematuria since.  Otherwise, has no complaints.  He states he was post be evaluated by his PCP or urologist in the next 2 days to determine if he did not need the Doll anymore.  Will provide oral hydration and trial void in the emergency department.  Patient able to urinate slightly during my evaluation.  Discussed with patient will monitor for few hours and encouraged urination versus replacement of Doll.  Patient aware and agreeable to plan    PE: well appearing, nontoxic, no respiratory distress.  Abdomen soft, nontender nondistended Neuro nonfocal.  Skin intact. Psych normal mood.

## 2025-07-30 NOTE — ED ADULT NURSE NOTE - NS ED NURSE LEVEL OF CONSCIOUSNESS MENTAL STATUS
Awake/Alert Bed/Stretcher in lowest position, wheels locked, appropriate side rails in place/Call bell, personal items and telephone in reach/Instruct patient to call for assistance before getting out of bed/chair/stretcher/Non-slip footwear applied when patient is off stretcher/[place] to call system/Physically safe environment - no spills, clutter or unnecessary equipment/Purposeful proactive rounding/Room/bathroom lighting operational, light cord in reach

## 2025-07-30 NOTE — ED PROVIDER NOTE - PATIENT PORTAL LINK FT
You can access the FollowMyHealth Patient Portal offered by [facility] by registering at the following website: [email or web address]. By joining [employer]’s FollowMyHealth portal, you will also be able to view your health information using other applications (apps) compatible with our system.

## 2025-07-30 NOTE — ED PROVIDER NOTE - PROGRESS NOTE DETAILS
Attending Betsy Lamar MD: patient with continued UOP in the ED  stable for dc advised to maintain appointments

## 2025-07-30 NOTE — ED PROVIDER NOTE - NSFOLLOWUPINSTRUCTIONS_ED_ALL_ED_FT
Urinary Retention in Men    WHAT YOU NEED TO KNOW:    What is urinary retention? Urinary retention is a condition that develops when your bladder does not empty completely when you urinate.  Male Reproductive System    What causes urinary retention?    An enlarged prostate    Blockages, such as a stone, growth, or narrowing of your urethra    A weak bladder muscle    Nerve damage from diabetes, stroke, or spinal cord injury    Bladder diverticula, which are pockets of urine that form in your bladder and do not empty    Certain medicines, such as narcotics, antihistamines, or antidepressants  What are the signs and symptoms of urinary retention?    Frequent urination, or the urge to urinate right after you finish    An urge to urinate, but your urine does not come out or dribbles out slowly and weakly    Frequent urine leaks that happen during the day or while you sleep    Pain or pressure when you urinate    Pain or stiffness in your abdomen, lower back, hips, or upper thighs    Blood in your urine  How is urinary retention diagnosed? Your healthcare provider will ask about your health history and the medicines you take. Your provider will press or tap on your lower abdomen. You may also need any of the following tests:    A digital rectal exam is when healthcare providers carefully feel the size of your prostate.    A post void residual test will show how much urine is left in your bladder after you urinate. You will be asked to urinate and then healthcare providers will use a small ultrasound machine to check how much urine is left in your bladder.    Blood or urine tests may show infection or prostate specific antigen (PSA) levels. PSA may be elevated in prostate cancer.    An ultrasound uses sound waves to show pictures on a monitor. An ultrasound may be done to show bladder stones, infection, or other problems.    A CT scan, or CAT scan, is a type of x-ray that is taken of your prostate, kidneys, and bladder. The pictures may show what is causing your urinary retention. You may be given a dye before the pictures are taken to help healthcare providers see the pictures better. Tell the healthcare provider if you have ever had an allergic reaction to contrast dye.  How is urinary retention treated?    A Doll catheter is a tube put into your bladder to drain urine into a bag. Keep the bag below your waist. This will prevent urine from flowing back into your bladder and causing an infection or other problems. Also, keep the tube free of kinks so the urine will drain properly. Do not pull on the catheter. This can cause pain and bleeding, and may cause the catheter to come out.    Medicines can help decrease the size of your prostate, fight infection, and help you urinate more easily.    Surgery may be needed to treat the condition that is causing your urinary retention.  When should I contact my healthcare provider?    You have a fever.    You have pain when you urinate.    You have blood in your urine.    You have problems with your catheter.    You have questions or concerns about your condition or care.  When should I seek immediate care or call 911?    You have severe abdominal pain.    You are breathing faster than usual.    Your heartbeat is faster than usual.    Your face, hands, feet, or ankles are swollen.

## 2025-07-30 NOTE — ED PROVIDER NOTE - NSICDXPASTSURGICALHX_GEN_ALL_CORE_FT
PAST SURGICAL HISTORY:  H/O colostomy Colostomy & Reversal 1956    H/O hernia repair     H/O lithotripsy     Status post cardiac catheterization 2001 [facility]    Status post coronary artery stent placement X 3 2001 [facility]

## 2025-08-26 ENCOUNTER — APPOINTMENT (OUTPATIENT)
Dept: ELECTROPHYSIOLOGY | Facility: CLINIC | Age: 85
End: 2025-08-26

## (undated) DEVICE — ELCTR GROUNDING PAD ADULT COVIDIEN

## (undated) DEVICE — TUBING CANNULA SALTER LABS NASAL ADULT 7FT

## (undated) DEVICE — SYR LUER SLIP TIP 50CC

## (undated) DEVICE — ENDOCUFF VISION SZ 3 SM PRPL

## (undated) DEVICE — CLAMP BX HOT RAD JAW 3

## (undated) DEVICE — CATH ELCTR GLIDE PRB 7FR

## (undated) DEVICE — TUBING SUCTION CONN 6FT STERILE

## (undated) DEVICE — FORCEP RADIAL JAW 4 W NDL 2.4MM 2.8MM 240CM ORANGE DISP

## (undated) DEVICE — SENSOR O2 FINGER XL ADULT 24/BX 6BX/CA

## (undated) DEVICE — TUBE RECTAL 24FR

## (undated) DEVICE — BRUSH COLONOSCOPY CYTOLOGY

## (undated) DEVICE — Device

## (undated) DEVICE — TUBING IV SET SECONDARY 34"

## (undated) DEVICE — POLY TRAP ETRAP

## (undated) DEVICE — SET IV PUMP BLOOD 1VALVE 180FILTER NON-DEHP

## (undated) DEVICE — SNARE LRG

## (undated) DEVICE — FORMALIN CUPS 10% BUFFERED

## (undated) DEVICE — CANISTER SUCTION 1200CC 10/SL

## (undated) DEVICE — ENDOCUFF VISION SZ 2 LG GRN

## (undated) DEVICE — SOL IRR POUR H2O 500ML

## (undated) DEVICE — VALVE BIOPSY

## (undated) DEVICE — TUBE O2 SUPL CRUSH RESIS CONN SOUTHSIDE ONLY

## (undated) DEVICE — FORCEP RADIAL JAW 4 JUMBO 2.8MM 3.2MM 240CM ORANGE DISP

## (undated) DEVICE — MASK O2 NON REBREATH 3IN1 ADULT

## (undated) DEVICE — SUT HEWSON RETRIEVER

## (undated) DEVICE — TRAP QUICK CATCH  SINGL CHAMBER

## (undated) DEVICE — SOL IRR NS 0.9% 250ML

## (undated) DEVICE — NDL INJ SCLERO INTERJECT 23G

## (undated) DEVICE — MASK LRG MED AND HIGH O2 CONC M TO M 10FT

## (undated) DEVICE — TUBING IV SET GRAVITY 3Y 100" MACRO

## (undated) DEVICE — SNARE POLYP SENS 27MM 240CM

## (undated) DEVICE — SYR IV POSIFLUSH NS 3ML 30/TY

## (undated) DEVICE — STERIS DEFENDO 3-PIECE KIT (AIR/WATER, SUCTION & BIOPSY VALVES)

## (undated) DEVICE — SUCTION YANKAUER TAPERED BULBOUS NO VENT

## (undated) DEVICE — CATH ELECHMSTAT  INJ 7FR 210CM

## (undated) DEVICE — CATH IV SAFE BC 20G X 1.16" (PINK)

## (undated) DEVICE — CATH IV SAFE BC 22G X 1" (BLUE)

## (undated) DEVICE — RETRIEVER ROTH NET PLATINUM-UNIVERSAL

## (undated) DEVICE — PACK IV START WITH CHG

## (undated) DEVICE — MARKER ENDO SPOT EX

## (undated) DEVICE — SYR LUER SLIP TIP 30CC

## (undated) DEVICE — TRAP SPECIMEN SPUTUM 40CC